# Patient Record
Sex: MALE | Race: WHITE | NOT HISPANIC OR LATINO | Employment: FULL TIME | URBAN - METROPOLITAN AREA
[De-identification: names, ages, dates, MRNs, and addresses within clinical notes are randomized per-mention and may not be internally consistent; named-entity substitution may affect disease eponyms.]

---

## 2017-01-06 ENCOUNTER — COMMUNICATION - HEALTHEAST (OUTPATIENT)
Dept: FAMILY MEDICINE | Facility: CLINIC | Age: 42
End: 2017-01-06

## 2017-01-06 DIAGNOSIS — E03.9 HYPOTHYROIDISM, UNSPECIFIED TYPE: ICD-10-CM

## 2017-01-21 ENCOUNTER — COMMUNICATION - HEALTHEAST (OUTPATIENT)
Dept: FAMILY MEDICINE | Facility: CLINIC | Age: 42
End: 2017-01-21

## 2017-01-21 DIAGNOSIS — E03.9 HYPOTHYROIDISM, UNSPECIFIED TYPE: ICD-10-CM

## 2017-04-04 ENCOUNTER — RECORDS - HEALTHEAST (OUTPATIENT)
Dept: ADMINISTRATIVE | Facility: OTHER | Age: 42
End: 2017-04-04

## 2017-05-01 ENCOUNTER — OFFICE VISIT - HEALTHEAST (OUTPATIENT)
Dept: FAMILY MEDICINE | Facility: CLINIC | Age: 42
End: 2017-05-01

## 2017-05-01 DIAGNOSIS — E03.9 HYPOTHYROIDISM, UNSPECIFIED TYPE: ICD-10-CM

## 2017-05-01 DIAGNOSIS — M24.111 LABRAL TEAR OF SHOULDER, DEGENERATIVE, RIGHT: ICD-10-CM

## 2017-05-01 DIAGNOSIS — Z01.818 PREOP GENERAL PHYSICAL EXAM: ICD-10-CM

## 2017-05-01 LAB
ATRIAL RATE - MUSE: 73 BPM
DIASTOLIC BLOOD PRESSURE - MUSE: NORMAL MMHG
INTERPRETATION ECG - MUSE: NORMAL
P AXIS - MUSE: 0 DEGREES
PR INTERVAL - MUSE: 170 MS
QRS DURATION - MUSE: 92 MS
QT - MUSE: 382 MS
QTC - MUSE: 420 MS
R AXIS - MUSE: -22 DEGREES
SYSTOLIC BLOOD PRESSURE - MUSE: NORMAL MMHG
T AXIS - MUSE: 8 DEGREES
VENTRICULAR RATE- MUSE: 73 BPM

## 2017-05-01 ASSESSMENT — MIFFLIN-ST. JEOR: SCORE: 2537.45

## 2017-05-03 ENCOUNTER — RECORDS - HEALTHEAST (OUTPATIENT)
Dept: ADMINISTRATIVE | Facility: OTHER | Age: 42
End: 2017-05-03

## 2017-05-16 ENCOUNTER — RECORDS - HEALTHEAST (OUTPATIENT)
Dept: ADMINISTRATIVE | Facility: OTHER | Age: 42
End: 2017-05-16

## 2017-06-26 ENCOUNTER — COMMUNICATION - HEALTHEAST (OUTPATIENT)
Dept: FAMILY MEDICINE | Facility: CLINIC | Age: 42
End: 2017-06-26

## 2017-06-26 DIAGNOSIS — E66.9 OBESITY, UNSPECIFIED: ICD-10-CM

## 2017-06-27 ENCOUNTER — RECORDS - HEALTHEAST (OUTPATIENT)
Dept: ADMINISTRATIVE | Facility: OTHER | Age: 42
End: 2017-06-27

## 2017-07-14 ENCOUNTER — COMMUNICATION - HEALTHEAST (OUTPATIENT)
Dept: SURGERY | Facility: CLINIC | Age: 42
End: 2017-07-14

## 2017-09-25 ENCOUNTER — RECORDS - HEALTHEAST (OUTPATIENT)
Dept: ADMINISTRATIVE | Facility: OTHER | Age: 42
End: 2017-09-25

## 2017-10-06 ENCOUNTER — COMMUNICATION - HEALTHEAST (OUTPATIENT)
Dept: FAMILY MEDICINE | Facility: CLINIC | Age: 42
End: 2017-10-06

## 2017-10-06 DIAGNOSIS — N52.9 ERECTILE DYSFUNCTION, UNSPECIFIED ERECTILE DYSFUNCTION TYPE: ICD-10-CM

## 2018-03-16 ENCOUNTER — RECORDS - HEALTHEAST (OUTPATIENT)
Dept: ADMINISTRATIVE | Facility: OTHER | Age: 43
End: 2018-03-16

## 2018-04-17 ENCOUNTER — COMMUNICATION - HEALTHEAST (OUTPATIENT)
Dept: FAMILY MEDICINE | Facility: CLINIC | Age: 43
End: 2018-04-17

## 2018-04-17 DIAGNOSIS — E03.9 HYPOTHYROIDISM, UNSPECIFIED TYPE: ICD-10-CM

## 2018-07-02 ENCOUNTER — OFFICE VISIT - HEALTHEAST (OUTPATIENT)
Dept: FAMILY MEDICINE | Facility: CLINIC | Age: 43
End: 2018-07-02

## 2018-07-02 DIAGNOSIS — Z00.00 ROUTINE GENERAL MEDICAL EXAMINATION AT A HEALTH CARE FACILITY: ICD-10-CM

## 2018-07-02 DIAGNOSIS — E66.9 OBESITY: ICD-10-CM

## 2018-07-02 DIAGNOSIS — E03.9 HYPOTHYROIDISM, UNSPECIFIED TYPE: ICD-10-CM

## 2018-07-02 DIAGNOSIS — N39.9 URINATION DISORDER: ICD-10-CM

## 2018-07-02 DIAGNOSIS — G25.81 RESTLESS LEGS SYNDROME (RLS): ICD-10-CM

## 2018-07-02 ASSESSMENT — MIFFLIN-ST. JEOR: SCORE: 2197.6

## 2018-07-09 ENCOUNTER — AMBULATORY - HEALTHEAST (OUTPATIENT)
Dept: LAB | Facility: CLINIC | Age: 43
End: 2018-07-09

## 2018-07-09 DIAGNOSIS — E03.9 HYPOTHYROIDISM, UNSPECIFIED TYPE: ICD-10-CM

## 2018-07-09 DIAGNOSIS — Z00.00 ROUTINE GENERAL MEDICAL EXAMINATION AT A HEALTH CARE FACILITY: ICD-10-CM

## 2018-07-09 DIAGNOSIS — N39.9 URINATION DISORDER: ICD-10-CM

## 2018-07-09 LAB
ALBUMIN SERPL-MCNC: 4.3 G/DL (ref 3.5–5)
ALP SERPL-CCNC: 83 U/L (ref 45–120)
ALT SERPL W P-5'-P-CCNC: 27 U/L (ref 0–45)
ANION GAP SERPL CALCULATED.3IONS-SCNC: 11 MMOL/L (ref 5–18)
AST SERPL W P-5'-P-CCNC: 21 U/L (ref 0–40)
BILIRUB SERPL-MCNC: 0.6 MG/DL (ref 0–1)
BUN SERPL-MCNC: 17 MG/DL (ref 8–22)
CALCIUM SERPL-MCNC: 9.9 MG/DL (ref 8.5–10.5)
CHLORIDE BLD-SCNC: 109 MMOL/L (ref 98–107)
CHOLEST SERPL-MCNC: 183 MG/DL
CO2 SERPL-SCNC: 23 MMOL/L (ref 22–31)
CREAT SERPL-MCNC: 1.06 MG/DL (ref 0.7–1.3)
ERYTHROCYTE [DISTWIDTH] IN BLOOD BY AUTOMATED COUNT: 11.7 % (ref 11–14.5)
FASTING STATUS PATIENT QL REPORTED: YES
GFR SERPL CREATININE-BSD FRML MDRD: >60 ML/MIN/1.73M2
GLUCOSE BLD-MCNC: 87 MG/DL (ref 70–125)
HCT VFR BLD AUTO: 48.8 % (ref 40–54)
HDLC SERPL-MCNC: 38 MG/DL
HGB BLD-MCNC: 16.7 G/DL (ref 14–18)
LDLC SERPL CALC-MCNC: 115 MG/DL
MCH RBC QN AUTO: 31.2 PG (ref 27–34)
MCHC RBC AUTO-ENTMCNC: 34.1 G/DL (ref 32–36)
MCV RBC AUTO: 91 FL (ref 80–100)
PLATELET # BLD AUTO: 239 THOU/UL (ref 140–440)
PMV BLD AUTO: 8.6 FL (ref 7–10)
POTASSIUM BLD-SCNC: 4.7 MMOL/L (ref 3.5–5)
PROT SERPL-MCNC: 7 G/DL (ref 6–8)
PSA SERPL-MCNC: 0.6 NG/ML (ref 0–2.5)
RBC # BLD AUTO: 5.34 MILL/UL (ref 4.4–6.2)
SODIUM SERPL-SCNC: 143 MMOL/L (ref 136–145)
T4 FREE SERPL-MCNC: 1.1 NG/DL (ref 0.7–1.8)
TRIGL SERPL-MCNC: 150 MG/DL
TSH SERPL DL<=0.005 MIU/L-ACNC: 5.04 UIU/ML (ref 0.3–5)
WBC: 7.9 THOU/UL (ref 4–11)

## 2018-08-08 ENCOUNTER — COMMUNICATION - HEALTHEAST (OUTPATIENT)
Dept: ADMINISTRATIVE | Facility: CLINIC | Age: 43
End: 2018-08-08

## 2018-10-09 ENCOUNTER — COMMUNICATION - HEALTHEAST (OUTPATIENT)
Dept: FAMILY MEDICINE | Facility: CLINIC | Age: 43
End: 2018-10-09

## 2018-10-09 DIAGNOSIS — N52.9 ERECTILE DYSFUNCTION, UNSPECIFIED ERECTILE DYSFUNCTION TYPE: ICD-10-CM

## 2018-10-20 ENCOUNTER — AMBULATORY - HEALTHEAST (OUTPATIENT)
Dept: NURSING | Facility: CLINIC | Age: 43
End: 2018-10-20

## 2018-10-23 ENCOUNTER — COMMUNICATION - HEALTHEAST (OUTPATIENT)
Dept: FAMILY MEDICINE | Facility: CLINIC | Age: 43
End: 2018-10-23

## 2018-10-23 DIAGNOSIS — E66.9 OBESITY: ICD-10-CM

## 2018-11-04 ENCOUNTER — COMMUNICATION - HEALTHEAST (OUTPATIENT)
Dept: FAMILY MEDICINE | Facility: CLINIC | Age: 43
End: 2018-11-04

## 2018-11-07 ENCOUNTER — COMMUNICATION - HEALTHEAST (OUTPATIENT)
Dept: FAMILY MEDICINE | Facility: CLINIC | Age: 43
End: 2018-11-07

## 2018-11-15 ENCOUNTER — RECORDS - HEALTHEAST (OUTPATIENT)
Dept: ADMINISTRATIVE | Facility: OTHER | Age: 43
End: 2018-11-15

## 2019-01-11 ENCOUNTER — RECORDS - HEALTHEAST (OUTPATIENT)
Dept: ADMINISTRATIVE | Facility: OTHER | Age: 44
End: 2019-01-11

## 2019-04-18 ENCOUNTER — COMMUNICATION - HEALTHEAST (OUTPATIENT)
Dept: FAMILY MEDICINE | Facility: CLINIC | Age: 44
End: 2019-04-18

## 2019-05-03 ENCOUNTER — OFFICE VISIT - HEALTHEAST (OUTPATIENT)
Dept: FAMILY MEDICINE | Facility: CLINIC | Age: 44
End: 2019-05-03

## 2019-05-03 DIAGNOSIS — E66.811 CLASS 1 OBESITY WITHOUT SERIOUS COMORBIDITY WITH BODY MASS INDEX (BMI) OF 33.0 TO 33.9 IN ADULT, UNSPECIFIED OBESITY TYPE: ICD-10-CM

## 2019-05-03 RX ORDER — GLYCOPYRROLATE 1 MG/1
TABLET ORAL
Refills: 5 | Status: SHIPPED | COMMUNITY
Start: 2019-02-04 | End: 2022-07-25

## 2019-05-03 RX ORDER — PHENOL 1.4 %
10 AEROSOL, SPRAY (ML) MUCOUS MEMBRANE
Status: SHIPPED | COMMUNITY
Start: 2017-10-30 | End: 2022-05-06

## 2019-10-02 ENCOUNTER — COMMUNICATION - HEALTHEAST (OUTPATIENT)
Dept: FAMILY MEDICINE | Facility: CLINIC | Age: 44
End: 2019-10-02

## 2019-10-02 DIAGNOSIS — E03.9 HYPOTHYROIDISM, UNSPECIFIED TYPE: ICD-10-CM

## 2020-01-05 ENCOUNTER — COMMUNICATION - HEALTHEAST (OUTPATIENT)
Dept: FAMILY MEDICINE | Facility: CLINIC | Age: 45
End: 2020-01-05

## 2020-01-05 DIAGNOSIS — N52.9 ERECTILE DYSFUNCTION, UNSPECIFIED ERECTILE DYSFUNCTION TYPE: ICD-10-CM

## 2020-01-31 ENCOUNTER — RECORDS - HEALTHEAST (OUTPATIENT)
Dept: ADMINISTRATIVE | Facility: OTHER | Age: 45
End: 2020-01-31

## 2020-02-07 ENCOUNTER — COMMUNICATION - HEALTHEAST (OUTPATIENT)
Dept: FAMILY MEDICINE | Facility: CLINIC | Age: 45
End: 2020-02-07

## 2020-02-07 DIAGNOSIS — E03.9 HYPOTHYROIDISM, UNSPECIFIED TYPE: ICD-10-CM

## 2020-02-12 ENCOUNTER — OFFICE VISIT - HEALTHEAST (OUTPATIENT)
Dept: FAMILY MEDICINE | Facility: CLINIC | Age: 45
End: 2020-02-12

## 2020-02-12 DIAGNOSIS — Z00.00 ROUTINE GENERAL MEDICAL EXAMINATION AT A HEALTH CARE FACILITY: ICD-10-CM

## 2020-02-12 DIAGNOSIS — E66.811 CLASS 1 OBESITY WITHOUT SERIOUS COMORBIDITY WITH BODY MASS INDEX (BMI) OF 33.0 TO 33.9 IN ADULT, UNSPECIFIED OBESITY TYPE: ICD-10-CM

## 2020-02-12 DIAGNOSIS — Z23 TETANUS-DIPHTHERIA (TD) VACCINATION: ICD-10-CM

## 2020-02-12 DIAGNOSIS — E03.9 HYPOTHYROIDISM, UNSPECIFIED TYPE: ICD-10-CM

## 2020-02-12 DIAGNOSIS — N52.9 ERECTILE DYSFUNCTION, UNSPECIFIED ERECTILE DYSFUNCTION TYPE: ICD-10-CM

## 2020-02-12 ASSESSMENT — MIFFLIN-ST. JEOR: SCORE: 2163.13

## 2020-02-13 ENCOUNTER — AMBULATORY - HEALTHEAST (OUTPATIENT)
Dept: LAB | Facility: CLINIC | Age: 45
End: 2020-02-13

## 2020-02-13 DIAGNOSIS — Z00.00 ROUTINE GENERAL MEDICAL EXAMINATION AT A HEALTH CARE FACILITY: ICD-10-CM

## 2020-02-13 DIAGNOSIS — E03.9 HYPOTHYROIDISM, UNSPECIFIED TYPE: ICD-10-CM

## 2020-02-13 LAB
ALBUMIN SERPL-MCNC: 4.2 G/DL (ref 3.5–5)
ALP SERPL-CCNC: 84 U/L (ref 45–120)
ALT SERPL W P-5'-P-CCNC: 27 U/L (ref 0–45)
ANION GAP SERPL CALCULATED.3IONS-SCNC: 9 MMOL/L (ref 5–18)
AST SERPL W P-5'-P-CCNC: 25 U/L (ref 0–40)
BILIRUB SERPL-MCNC: 1.1 MG/DL (ref 0–1)
BUN SERPL-MCNC: 16 MG/DL (ref 8–22)
CALCIUM SERPL-MCNC: 9.6 MG/DL (ref 8.5–10.5)
CHLORIDE BLD-SCNC: 106 MMOL/L (ref 98–107)
CHOLEST SERPL-MCNC: 196 MG/DL
CO2 SERPL-SCNC: 26 MMOL/L (ref 22–31)
CREAT SERPL-MCNC: 1.13 MG/DL (ref 0.7–1.3)
FASTING STATUS PATIENT QL REPORTED: YES
GFR SERPL CREATININE-BSD FRML MDRD: >60 ML/MIN/1.73M2
GLUCOSE BLD-MCNC: 88 MG/DL (ref 70–125)
HDLC SERPL-MCNC: 45 MG/DL
LDLC SERPL CALC-MCNC: 129 MG/DL
POTASSIUM BLD-SCNC: 4.4 MMOL/L (ref 3.5–5)
PROT SERPL-MCNC: 7 G/DL (ref 6–8)
PSA SERPL-MCNC: 0.6 NG/ML (ref 0–2.5)
SODIUM SERPL-SCNC: 141 MMOL/L (ref 136–145)
TRIGL SERPL-MCNC: 108 MG/DL
TSH SERPL DL<=0.005 MIU/L-ACNC: 3.06 UIU/ML (ref 0.3–5)

## 2020-03-19 ENCOUNTER — COMMUNICATION - HEALTHEAST (OUTPATIENT)
Dept: FAMILY MEDICINE | Facility: CLINIC | Age: 45
End: 2020-03-19

## 2020-03-19 DIAGNOSIS — E03.9 HYPOTHYROIDISM, UNSPECIFIED TYPE: ICD-10-CM

## 2020-05-18 ENCOUNTER — COMMUNICATION - HEALTHEAST (OUTPATIENT)
Dept: SCHEDULING | Facility: CLINIC | Age: 45
End: 2020-05-18

## 2020-05-18 ENCOUNTER — OFFICE VISIT - HEALTHEAST (OUTPATIENT)
Dept: FAMILY MEDICINE | Facility: CLINIC | Age: 45
End: 2020-05-18

## 2020-05-18 DIAGNOSIS — R10.84 ABDOMINAL PAIN, GENERALIZED: ICD-10-CM

## 2020-05-19 ENCOUNTER — RECORDS - HEALTHEAST (OUTPATIENT)
Dept: GENERAL RADIOLOGY | Facility: CLINIC | Age: 45
End: 2020-05-19

## 2020-05-19 DIAGNOSIS — R10.84 GENERALIZED ABDOMINAL PAIN: ICD-10-CM

## 2020-05-28 ENCOUNTER — COMMUNICATION - HEALTHEAST (OUTPATIENT)
Dept: FAMILY MEDICINE | Facility: CLINIC | Age: 45
End: 2020-05-28

## 2020-05-28 DIAGNOSIS — N52.9 ERECTILE DYSFUNCTION, UNSPECIFIED ERECTILE DYSFUNCTION TYPE: ICD-10-CM

## 2020-05-28 DIAGNOSIS — K59.00 CONSTIPATION, UNSPECIFIED CONSTIPATION TYPE: ICD-10-CM

## 2020-06-13 ENCOUNTER — COMMUNICATION - HEALTHEAST (OUTPATIENT)
Dept: FAMILY MEDICINE | Facility: CLINIC | Age: 45
End: 2020-06-13

## 2020-06-13 DIAGNOSIS — E66.811 CLASS 1 OBESITY WITHOUT SERIOUS COMORBIDITY WITH BODY MASS INDEX (BMI) OF 33.0 TO 33.9 IN ADULT, UNSPECIFIED OBESITY TYPE: ICD-10-CM

## 2020-07-14 ENCOUNTER — COMMUNICATION - HEALTHEAST (OUTPATIENT)
Dept: FAMILY MEDICINE | Facility: CLINIC | Age: 45
End: 2020-07-14

## 2020-07-14 ENCOUNTER — RECORDS - HEALTHEAST (OUTPATIENT)
Dept: ADMINISTRATIVE | Facility: OTHER | Age: 45
End: 2020-07-14

## 2020-07-14 DIAGNOSIS — N52.9 ERECTILE DYSFUNCTION, UNSPECIFIED ERECTILE DYSFUNCTION TYPE: ICD-10-CM

## 2020-07-14 RX ORDER — SILDENAFIL CITRATE 20 MG/1
20 TABLET ORAL PRN
Qty: 60 TABLET | Refills: 6 | Status: SHIPPED | OUTPATIENT
Start: 2020-07-14 | End: 2021-08-06

## 2020-07-16 ENCOUNTER — COMMUNICATION - HEALTHEAST (OUTPATIENT)
Dept: FAMILY MEDICINE | Facility: CLINIC | Age: 45
End: 2020-07-16

## 2020-07-30 ENCOUNTER — RECORDS - HEALTHEAST (OUTPATIENT)
Dept: ADMINISTRATIVE | Facility: OTHER | Age: 45
End: 2020-07-30

## 2020-08-13 ENCOUNTER — RECORDS - HEALTHEAST (OUTPATIENT)
Dept: ADMINISTRATIVE | Facility: OTHER | Age: 45
End: 2020-08-13

## 2020-08-20 ENCOUNTER — COMMUNICATION - HEALTHEAST (OUTPATIENT)
Dept: FAMILY MEDICINE | Facility: CLINIC | Age: 45
End: 2020-08-20

## 2020-08-20 DIAGNOSIS — E03.9 HYPOTHYROIDISM, UNSPECIFIED TYPE: ICD-10-CM

## 2020-09-01 ENCOUNTER — COMMUNICATION - HEALTHEAST (OUTPATIENT)
Dept: FAMILY MEDICINE | Facility: CLINIC | Age: 45
End: 2020-09-01

## 2020-10-19 ENCOUNTER — COMMUNICATION - HEALTHEAST (OUTPATIENT)
Dept: FAMILY MEDICINE | Facility: CLINIC | Age: 45
End: 2020-10-19

## 2020-10-19 DIAGNOSIS — E66.811 CLASS 1 OBESITY WITHOUT SERIOUS COMORBIDITY WITH BODY MASS INDEX (BMI) OF 33.0 TO 33.9 IN ADULT, UNSPECIFIED OBESITY TYPE: ICD-10-CM

## 2020-10-21 ENCOUNTER — OFFICE VISIT - HEALTHEAST (OUTPATIENT)
Dept: FAMILY MEDICINE | Facility: CLINIC | Age: 45
End: 2020-10-21

## 2020-10-21 DIAGNOSIS — E66.811 CLASS 1 OBESITY WITHOUT SERIOUS COMORBIDITY WITH BODY MASS INDEX (BMI) OF 33.0 TO 33.9 IN ADULT, UNSPECIFIED OBESITY TYPE: ICD-10-CM

## 2020-11-03 ENCOUNTER — RECORDS - HEALTHEAST (OUTPATIENT)
Dept: ADMINISTRATIVE | Facility: OTHER | Age: 45
End: 2020-11-03

## 2020-11-19 ENCOUNTER — COMMUNICATION - HEALTHEAST (OUTPATIENT)
Dept: FAMILY MEDICINE | Facility: CLINIC | Age: 45
End: 2020-11-19

## 2021-01-14 ENCOUNTER — COMMUNICATION - HEALTHEAST (OUTPATIENT)
Dept: FAMILY MEDICINE | Facility: CLINIC | Age: 46
End: 2021-01-14

## 2021-01-14 DIAGNOSIS — M19.90 ARTHRITIS: ICD-10-CM

## 2021-01-14 DIAGNOSIS — R41.840 POOR CONCENTRATION: ICD-10-CM

## 2021-03-31 ENCOUNTER — OFFICE VISIT - HEALTHEAST (OUTPATIENT)
Dept: FAMILY MEDICINE | Facility: CLINIC | Age: 46
End: 2021-03-31

## 2021-03-31 DIAGNOSIS — E03.9 HYPOTHYROIDISM, UNSPECIFIED TYPE: ICD-10-CM

## 2021-03-31 DIAGNOSIS — E66.811 CLASS 1 OBESITY WITHOUT SERIOUS COMORBIDITY WITH BODY MASS INDEX (BMI) OF 33.0 TO 33.9 IN ADULT, UNSPECIFIED OBESITY TYPE: ICD-10-CM

## 2021-03-31 LAB
T4 FREE SERPL-MCNC: 1.2 NG/DL (ref 0.7–1.8)
TSH SERPL DL<=0.005 MIU/L-ACNC: 1.66 UIU/ML (ref 0.3–5)

## 2021-03-31 RX ORDER — LEVOTHYROXINE SODIUM 150 UG/1
TABLET ORAL
Qty: 90 TABLET | Refills: 2 | Status: SHIPPED | OUTPATIENT
Start: 2021-03-31 | End: 2022-04-04

## 2021-03-31 ASSESSMENT — MIFFLIN-ST. JEOR: SCORE: 2194.88

## 2021-05-24 ENCOUNTER — OFFICE VISIT - HEALTHEAST (OUTPATIENT)
Dept: FAMILY MEDICINE | Facility: CLINIC | Age: 46
End: 2021-05-24

## 2021-05-24 DIAGNOSIS — F90.9 ATTENTION DEFICIT HYPERACTIVITY DISORDER (ADHD), UNSPECIFIED ADHD TYPE: ICD-10-CM

## 2021-05-24 RX ORDER — MINOCYCLINE HYDROCHLORIDE 100 MG/1
CAPSULE ORAL
Status: SHIPPED | COMMUNITY
Start: 2020-11-03 | End: 2022-05-06

## 2021-05-24 RX ORDER — DEXTROAMPHETAMINE SACCHARATE, AMPHETAMINE ASPARTATE MONOHYDRATE, DEXTROAMPHETAMINE SULFATE AND AMPHETAMINE SULFATE 2.5; 2.5; 2.5; 2.5 MG/1; MG/1; MG/1; MG/1
10 CAPSULE, EXTENDED RELEASE ORAL DAILY
Qty: 30 CAPSULE | Refills: 0 | Status: SHIPPED | OUTPATIENT
Start: 2021-05-24 | End: 2022-07-25

## 2021-05-28 NOTE — PROGRESS NOTES
ASSESSMENT:  1. Class 1 obesity without serious comorbidity with body mass index (BMI) of 33.0 to 33.9 in adult, unspecified obesity type  - phentermine (ADIPEX-P) 37.5 mg tablet; Take 1/2 a tab twice a day.  Dispense: 30 tablet; Refill: 1  Discussed with him the problems with phentermine including side effects as well as complications.  Explained to him the need that he be monitored consistently for blood pressure as well as cardiovascular disease because of the possibility of phentermine causing dose.  He will have his industrial nurse at  keep an eye on his blood pressure and keep a record as well as keeping a record of his weight.  A review of his previous notes showed that he did have an improvement in his weight is at that time.  I encouraged him to also watch his diet and exercise.    PLAN:  Follow-up will be as we planned.    No orders of the defined types were placed in this encounter.    Medications Discontinued During This Encounter   Medication Reason     phentermine (ADIPEX-P) 37.5 mg tablet Reorder       No follow-ups on file.      CHIEF COMPLAINT:  Chief Complaint   Patient presents with     Obesity     would like to restart phentermine        HISTORY OF PRESENT ILLNESS:  Cecil is a 44 y.o. male presenting to the clinic today wanting to restart taking phentermine.  He was prescribed with phentermine about a year ago for weight loss.  He was referred to a weight loss clinic and did have phentermine started.  As at that time he was also exercising and watching his diet and noted to have lost over 80 pounds unfortunately he did stop taking the medicine and is beginning to gain weight back.  He wants to restart weight loss again and is hoping to get a re-view and refilling of his phentermine.  He does not have any heart disease, denied having any chest pain or shortness of breath.  There is no swelling to his lower extremity.  There is a family history of heart disease which he is aware of.  This is 1  of the reasons that he wanted to make sure to lose weight.  He also does have history of hypothyroidism and is on replacement medication for that.    REVIEW OF SYSTEMS:   Full review of system was done and is negative except as stated in HPI.   PFSH:  Reviewed, as below.    Social History     Tobacco Use   Smoking Status Never Smoker   Smokeless Tobacco Never Used       Family History   Problem Relation Age of Onset     Coronary artery disease Mother      Hypertension Mother      Diabetes type II Mother      Benign prostatic hyperplasia Mother      Heart disease Father         Afib with Valvular replacement.     Thyroid disease Father      Thyroid disease Paternal Aunt      Alzheimer's disease Paternal Grandmother        Social History     Socioeconomic History     Marital status:      Spouse name: Not on file     Number of children: Not on file     Years of education: Not on file     Highest education level: Not on file   Occupational History     Not on file   Social Needs     Financial resource strain: Not on file     Food insecurity:     Worry: Not on file     Inability: Not on file     Transportation needs:     Medical: Not on file     Non-medical: Not on file   Tobacco Use     Smoking status: Never Smoker     Smokeless tobacco: Never Used   Substance and Sexual Activity     Alcohol use: Yes     Alcohol/week: 1.2 oz     Types: 1 Glasses of wine, 1 Cans of beer per week     Drug use: Not on file     Sexual activity: Yes     Partners: Female   Lifestyle     Physical activity:     Days per week: Not on file     Minutes per session: Not on file     Stress: Not on file   Relationships     Social connections:     Talks on phone: Not on file     Gets together: Not on file     Attends Yazidi service: Not on file     Active member of club or organization: Not on file     Attends meetings of clubs or organizations: Not on file     Relationship status: Not on file     Intimate partner violence:     Fear of  current or ex partner: Not on file     Emotionally abused: Not on file     Physically abused: Not on file     Forced sexual activity: Not on file   Other Topics Concern     Not on file   Social History Narrative     Not on file       Past Surgical History:   Procedure Laterality Date     RI APPENDECTOMY      Description: Appendectomy;  Recorded: 09/09/2008;  Comments: 1990.       No Known Allergies    Active Ambulatory Problems     Diagnosis Date Noted     Obesity      Hypothyroidism      Routine general medical examination at a health care facility 03/25/2015     Resolved Ambulatory Problems     Diagnosis Date Noted     No Resolved Ambulatory Problems     No Additional Past Medical History       Current Outpatient Medications   Medication Sig Dispense Refill     ibuprofen (ADVIL,MOTRIN) 600 MG tablet TAKE 1 TABLET (600 MG TOTAL) BY MOUTH 3 (THREE) TIMES A DAY. TAKE WITH FOOD 42 tablet 0     levothyroxine (SYNTHROID, LEVOTHROID) 150 MCG tablet Take 1 tablet (150 mcg total) by mouth Daily at 6:00 am. 90 tablet 3     melatonin 10 mg Tab Take 10 mg by mouth.       phentermine (ADIPEX-P) 37.5 mg tablet Take 1/2 a tab twice a day. 30 tablet 1     sildenafil (VIAGRA) 100 MG tablet Take 1 tablet (100 mg total) by mouth daily as needed for erectile dysfunction. 10 tablet 0     glycopyrrolate (ROBINUL) 1 mg tablet TAKE 1-2 TABLETS BY MOUTH TWICE A DAY  5     No current facility-administered medications for this visit.        VITALS:  Vitals:    05/03/19 0752   BP: 112/86   Pulse: 84   Resp: 16   Weight: (!) 268 lb (121.6 kg)     Wt Readings from Last 3 Encounters:   05/03/19 (!) 268 lb (121.6 kg)   07/02/18 (!) 271 lb 9.6 oz (123.2 kg)   05/01/17 (!) 343 lb 14.4 oz (156 kg)     Body mass index is 33.5 kg/m .    PHYSICAL EXAM:  General Appearance: Alert, cooperative, no distress, appears stated age  HEENT: Pupils are equal and reactive, extraocular motions is normal. Neck is supple no notable thyromegaly.  External ears are  normal.  Lungs: Clear to auscultation bilaterally, respirations unlabored  Heart: Regular rhythm and normal rate, S1 and S2 normal, no murmur, rub, or gallop  Abdomen: Soft  Musculoskeletal: Normal range of motion. No joint swelling or deformity.   Neurologic:  Alert and oriented times 3. Cranial nerves II-XII intact.   Psychiatric: Normal mood and affect.    MEDICATIONS:  Current Outpatient Medications   Medication Sig Dispense Refill     ibuprofen (ADVIL,MOTRIN) 600 MG tablet TAKE 1 TABLET (600 MG TOTAL) BY MOUTH 3 (THREE) TIMES A DAY. TAKE WITH FOOD 42 tablet 0     levothyroxine (SYNTHROID, LEVOTHROID) 150 MCG tablet Take 1 tablet (150 mcg total) by mouth Daily at 6:00 am. 90 tablet 3     melatonin 10 mg Tab Take 10 mg by mouth.       phentermine (ADIPEX-P) 37.5 mg tablet Take 1/2 a tab twice a day. 30 tablet 1     sildenafil (VIAGRA) 100 MG tablet Take 1 tablet (100 mg total) by mouth daily as needed for erectile dysfunction. 10 tablet 0     glycopyrrolate (ROBINUL) 1 mg tablet TAKE 1-2 TABLETS BY MOUTH TWICE A DAY  5     No current facility-administered medications for this visit.

## 2021-05-30 VITALS — WEIGHT: 315 LBS | HEIGHT: 76 IN | BODY MASS INDEX: 38.36 KG/M2

## 2021-05-31 ENCOUNTER — RECORDS - HEALTHEAST (OUTPATIENT)
Dept: ADMINISTRATIVE | Facility: CLINIC | Age: 46
End: 2021-05-31

## 2021-06-01 VITALS — HEIGHT: 75 IN | BODY MASS INDEX: 33.77 KG/M2 | WEIGHT: 271.6 LBS

## 2021-06-01 NOTE — TELEPHONE ENCOUNTER
RN cannot approve Refill Request    RN can NOT refill this medication Protocol failed and NO refill given. Last office visit: 5/3/2019 Freddy Jim MD Last Physical: 7/2/2018 Last MTM visit: Visit date not found Last visit same specialty: 5/3/2019 Freddy Jim MD.  Next visit within 3 mo: Visit date not found  Next physical within 3 mo: Visit date not found      Chinyere Najera, [facility] [place] Connection Triage/Med Refill 10/2/2019    Requested Prescriptions   Pending Prescriptions Disp Refills     levothyroxine (SYNTHROID, LEVOTHROID) 150 MCG tablet [Pharmacy Med Name: LEVOTHYROXIN 150MCG TAB] 90 tablet 3     Sig: TAKE 1 TABLET BY MOUTH ONCE DAILY AT  6  AM       Thyroid Hormones Protocol Failed - 10/2/2019  7:01 AM        Failed - TSH on file in past 12 months for patient age 12 & older     TSH   Date Value Ref Range Status   07/09/2018 5.04 (H) 0.30 - 5.00 uIU/mL Final                   Passed - Provider visit in past 12 months or next 3 months     Last office visit with prescriber/PCP: 5/3/2019 Freddy Jim MD OR same dept: 5/3/2019 Freddy Jim MD OR same specialty: 5/3/2019 Freddy Jim MD  Last physical: 7/2/2018 Last MTM visit: Visit date not found   Next visit within 3 mo: Visit date not found  Next physical within 3 mo: Visit date not found  Prescriber OR PCP: Freddy Jim MD  Last diagnosis associated with med order: 1. Hypothyroidism, unspecified type  - levothyroxine (SYNTHROID, LEVOTHROID) 150 MCG tablet [Pharmacy Med Name: LEVOTHYROXIN 150MCG TAB]; TAKE 1 TABLET BY MOUTH ONCE DAILY AT  6  AM  Dispense: 90 tablet; Refill: 3    If protocol passes may refill for 12 months if within 3 months of last provider visit (or a total of 15 months).

## 2021-06-02 VITALS — BODY MASS INDEX: 33.5 KG/M2 | WEIGHT: 268 LBS

## 2021-06-03 ENCOUNTER — COMMUNICATION - HEALTHEAST (OUTPATIENT)
Dept: FAMILY MEDICINE | Facility: CLINIC | Age: 46
End: 2021-06-03

## 2021-06-04 VITALS
BODY MASS INDEX: 33 KG/M2 | DIASTOLIC BLOOD PRESSURE: 84 MMHG | WEIGHT: 264 LBS | OXYGEN SATURATION: 97 % | SYSTOLIC BLOOD PRESSURE: 122 MMHG | HEART RATE: 100 BPM

## 2021-06-04 VITALS
HEIGHT: 75 IN | SYSTOLIC BLOOD PRESSURE: 124 MMHG | BODY MASS INDEX: 32.83 KG/M2 | DIASTOLIC BLOOD PRESSURE: 88 MMHG | WEIGHT: 264 LBS | HEART RATE: 70 BPM | OXYGEN SATURATION: 97 %

## 2021-06-04 NOTE — TELEPHONE ENCOUNTER
Refill Approved    Rx renewed per Medication Renewal Policy. Medication was last renewed on 11/9/18.    Savanna Calero, [facility] Triage/Med Refill 1/5/2020     Requested Prescriptions   Pending Prescriptions Disp Refills     VIAGRA 100 mg tablet [Pharmacy Med Name: Viagra 100 MG Oral Tablet] 6 tablet 0     Sig: TAKE 1 TABLET BY MOUTH ONCE DAILY AS NEEDED FOR ERECTILE DYSFUNCTION       Medications for Impotence Refill Protocol Passed - 1/5/2020  6:30 AM        Passed - PCP or prescribing provider visit in last year     Last office visit with prescriber/PCP: 5/3/2019 Freddy Jim MD OR same dept: 5/3/2019 Freddy Jim MD OR same specialty: 5/3/2019 Freddy Jim MD  Last physical: 7/2/2018 Last MTM visit: Visit date not found   Next visit within 3 mo: Visit date not found  Next physical within 3 mo: Visit date not found  Prescriber OR PCP: Freddy Jim MD  Last diagnosis associated with med order: There are no diagnoses linked to this encounter.  If protocol passes may refill for 12 months if within 3 months of last provider visit (or a total of 15 months).

## 2021-06-05 VITALS
HEIGHT: 75 IN | BODY MASS INDEX: 33.69 KG/M2 | WEIGHT: 271 LBS | OXYGEN SATURATION: 97 % | SYSTOLIC BLOOD PRESSURE: 129 MMHG | HEART RATE: 80 BPM | DIASTOLIC BLOOD PRESSURE: 78 MMHG

## 2021-06-05 NOTE — TELEPHONE ENCOUNTER
RN cannot approve Refill Request    RN can NOT refill this medication Protocol failed and NO refill given.      Margi Valencia, [facility] Triage/Med Refill 2/7/2020    Requested Prescriptions   Pending Prescriptions Disp Refills     levothyroxine (SYNTHROID, LEVOTHROID) 150 MCG tablet [Pharmacy Med Name: Levothyroxine Sodium 150 MCG Oral Tablet] 90 tablet 0     Sig: TAKE 1 TABLET BY MOUTH ONCE DAILY AT  6  AM       Thyroid Hormones Protocol Failed - 2/7/2020  7:22 AM        Failed - TSH on file in past 12 months for patient age 12 & older     TSH   Date Value Ref Range Status   07/09/2018 5.04 (H) 0.30 - 5.00 uIU/mL Final                   Passed - Provider visit in past 12 months or next 3 months     Last office visit with prescriber/PCP: 5/3/2019 Freddy Jim MD OR same dept: 5/3/2019 Freddy Jim MD OR same specialty: 5/3/2019 Freddy Jim MD  Last physical: 7/2/2018 Last MTM visit: Visit date not found   Next visit within 3 mo: Visit date not found  Next physical within 3 mo: Visit date not found  Prescriber OR PCP: Freddy Jim MD  Last diagnosis associated with med order: 1. Hypothyroidism, unspecified type  - levothyroxine (SYNTHROID, LEVOTHROID) 150 MCG tablet [Pharmacy Med Name: Levothyroxine Sodium 150 MCG Oral Tablet]; TAKE 1 TABLET BY MOUTH ONCE DAILY AT  6  AM  Dispense: 90 tablet; Refill: 0    If protocol passes may refill for 12 months if within 3 months of last provider visit (or a total of 15 months).

## 2021-06-07 NOTE — TELEPHONE ENCOUNTER
Refill request for medication: euthyrox  Last visit addressing this medication: 02/20/20  Follow up plan 3  months  Last refill on 02/11/20, quantity #30   CSA completed n/a   checked  03/19/20, last dispensed refill n/a    Appointment: Not due     Roslyn Dawkins, [facility]

## 2021-06-08 NOTE — PROGRESS NOTES
"Cecil Moreira is a 45 y.o. male who is being evaluated via a billable video visit.      The patient has been notified of following:     \"This video visit will be conducted via a call between you and your physician/provider. We have found that certain health care needs can be provided without the need for an in-person physical exam.  This service lets us provide the care you need with a video conversation.  If a prescription is necessary we can send it directly to your pharmacy.  If lab work is needed we can place an order for that and you can then stop by our lab to have the test done at a later time.    Video visits are billed at different rates depending on your insurance coverage. Please reach out to your insurance provider with any questions.    If during the course of the call the physician/provider feels a video visit is not appropriate, you will not be charged for this service.\"    Patient has given verbal consent to a Video visit? Yes    Patient would like to receive their AVS by AVS Preference: Mail a copy.    Patient would like the video invitation sent by: Other e-mail: [email or web address]    Will anyone else be joining your video visit? No        Video Start Time: 9.05    Additional provider notes:  Patient complains of abdominal pain. The pain is described as aching, cramping and sharp, and is 6/10 in intensity. Pain is located in the area of the abdomen below the navel without radiation. Onset was 5 months ago. Symptoms have been gradually worsening since. Aggravating factors: none.  Alleviating factors: none. Associated symptoms: belching, constipation and sweats. The patient denies anorexia and diarrhea.  He is worried about Irritable Bowel.Has been using Metamucil and drinking water and thinks that it is making it worse.  He has some lumps noted on his back which are soft and not painful. He will want to deal with them at another time.  REVIEW OF SYSTEMS:  As in the history. Denies " vomiting. Has no chest pain. No skin rashes etc.  On Examination:  He does not look to be in any distress. Has good eye contact. Respiration is not labored. Speech is full and skin color is normal.  Assessment and plan:  1. Abdominal pain, generalized  - XR Abdomen 2 Views; Future  Sounds like he is having constipation . He has tried stool softeners using metamucil and drinking a lots of water. Will get an xray to see if there any abnormalities.      Video-Visit Details    Type of service:  Video Visit    Video End Time (time video stopped): 9:20 AM  Originating Location (pt. Location): Home    Distant Location (provider location):  [facility]/OB     Platform used for Video Visit: Richard Jim MD

## 2021-06-08 NOTE — TELEPHONE ENCOUNTER
Refill request for medication: phntermine  Last visit addressing this medication: 02/12/20  Follow up plan 3  months  Last refill on 03/19/20 , quantity #30   CSA completed none on file   checked  06/14/20, last dispensed refill 02/12/20    Appointment: Left message for patient     Roslyn Dawkins CMA     normal...

## 2021-06-08 NOTE — TELEPHONE ENCOUNTER
Controlled Substance Refill Request  Medication Name:   Requested Prescriptions     Pending Prescriptions Disp Refills     phentermine (ADIPEX-P) 37.5 mg tablet [Pharmacy Med Name: Phentermine HCl 37.5 MG Oral Tablet] 30 tablet 0     Sig: Take 1/2 (one-half) tablet by mouth twice daily     Date Last Fill: 2/12/20  Requested Pharmacy: [facility]  Submit electronically to pharmacy  Controlled Substance Agreement on file:   Encounter-Level CSA Scan Date:    There are no encounter-level csa scan date.        Last office visit:  5/18/20  Vale Monroy RN, MA  [facility]    Triage Nurse Advisor

## 2021-06-09 NOTE — TELEPHONE ENCOUNTER
Refill Approved    Rx renewed per Medication Renewal Policy. Medication was last renewed on 2/12/20.    Margi Valencia, [facility] Triage/Med Refill 7/14/2020     Requested Prescriptions   Pending Prescriptions Disp Refills     sildenafil (REVATIO) 20 mg tablet 60 tablet 0     Sig: Take 1 tablet (20 mg total) by mouth as needed.       Medications for Impotence Refill Protocol Passed - 7/14/2020  1:58 PM        Passed - PCP or prescribing provider visit in last year     Last office visit with prescriber/PCP: 5/3/2019 Freddy Jim MD OR same dept: Visit date not found OR same specialty: 5/3/2019 Freddy Jim MD  Last physical: 2/12/2020 Last MTM visit: Visit date not found   Next visit within 3 mo: Visit date not found  Next physical within 3 mo: Visit date not found  Prescriber OR PCP: Freddy Jim MD  Last diagnosis associated with med order: 1. Erectile dysfunction, unspecified erectile dysfunction type  - sildenafil (REVATIO) 20 mg tablet; Take 1 tablet (20 mg total) by mouth as needed.  Dispense: 60 tablet; Refill: 0    If protocol passes may refill for 12 months if within 3 months of last provider visit (or a total of 15 months).

## 2021-06-09 NOTE — TELEPHONE ENCOUNTER
Central PA team  [phone]  Pool: HE PA MED ([id number])          PA has been initiated.       PA form completed and faxed insurance via Cover My Meds     Key:  [id number]     Medication:  SILDENAFIL 20MG    Insurance:  [employer] [facility]        Response will be received via fax and may take up to 5-10 business days depending on plan

## 2021-06-09 NOTE — TELEPHONE ENCOUNTER
Please find out from the patient which of the medications he would prefer to get.  Either the Viagra 100mg that will be covered by his insurance, or getting refresher which will not be covered but which he can get a prescription and try to purchase it with a discount card.  Please let me know which ever one that he wants and I will put in an order for those.

## 2021-06-09 NOTE — TELEPHONE ENCOUNTER
Reached out to patient and left a message to return phone call. When patient calls back, please inform him his PA was denied, and ask if he would like to move forward with Viagra 100 mg or Revatio. Please also inform patient he can utilize a discount card to purchase the medication at a lower cost. Thank you, Liseth Martinez

## 2021-06-09 NOTE — TELEPHONE ENCOUNTER
Prior Authorization Request  Who s requesting:  Pharmacy  Pharmacy Name and Location: [id number]  Medication Name: sildenafil (REVATIO) 20 mg tablet   Insurance Plan: [employer] [facility]    Insurance Member ID Number:  [id number]  CoverMyMeds Key: [id number]  Informed patient that prior authorizations can take up to 10 business days for response:   NA  Okay to leave a detailed message: No

## 2021-06-10 NOTE — TELEPHONE ENCOUNTER
Refill Approved    Rx renewed per Medication Renewal Policy. Medication was last renewed on 3/20/20.    Margi Valencia, [facility] Triage/Med Refill 8/24/2020     Requested Prescriptions   Pending Prescriptions Disp Refills     EUTHYROX 150 mcg tablet [Pharmacy Med Name: Euthyrox 150 MCG Oral Tablet] 90 tablet 0     Sig: TAKE 1 TABLET BY MOUTH ONCE DAILY AT 6AM       Thyroid Hormones Protocol Passed - 8/20/2020  6:04 AM        Passed - Provider visit in past 12 months or next 3 months     Last office visit with prescriber/PCP: 5/3/2019 Freddy Jim MD OR same dept: Visit date not found OR same specialty: 5/3/2019 Freddy Jim MD  Last physical: 2/12/2020 Last MTM visit: Visit date not found   Next visit within 3 mo: Visit date not found  Next physical within 3 mo: Visit date not found  Prescriber OR PCP: Freddy Jim MD  Last diagnosis associated with med order: 1. Hypothyroidism, unspecified type  - EUTHYROX 150 mcg tablet [Pharmacy Med Name: Euthyrox 150 MCG Oral Tablet]; TAKE 1 TABLET BY MOUTH ONCE DAILY AT 6AM  Dispense: 90 tablet; Refill: 0    If protocol passes may refill for 12 months if within 3 months of last provider visit (or a total of 15 months).             Passed - TSH on file in past 12 months for patient age 12 & older     TSH   Date Value Ref Range Status   02/13/2020 3.06 0.30 - 5.00 uIU/mL Final

## 2021-06-10 NOTE — PROGRESS NOTES
ASSESSMENT:  1. Preop general physical exam  - HM2(CBC w/o Differential)  - Electrocardiogram Perform and Read    2. Labral tear of shoulder, degenerative, right    3. Hypothyroidism, unspecified type  - levothyroxine (SYNTHROID, LEVOTHROID) 150 MCG tablet; Take 1 tablet (150 mcg total) by mouth Daily at 6:00 am.  Dispense: 90 tablet; Refill: 3  - Thyroid Stimulating Hormone (TSH)     42 y.o. male with planned surgery- Shoulder Arthroscopy due to the above.    Known risk factors for perioperative complications: Obesity.    Difficulty with intubation is not anticipated.    Cardiac Risk Estimation: Has no history of any cardiac issues,he does exercise daily.  He does not have any symptoms of cardiac issues.  He is METs is more than 4.  There is no contraindications to surgery which should be carried out with appropriate anesthesia.    Current medications which may produce withdrawal symptoms if withheld perioperatively: None        PLAN:  There are no Patient Instructions on file for this visit.    Orders Placed This Encounter   Procedures     Thyroid Stimulating Hormone (TSH)     HM2(CBC w/o Differential)     Electrocardiogram Perform and Read     Medications Discontinued During This Encounter   Medication Reason     levothyroxine (SYNTHROID) 150 MCG tablet Therapy completed     levothyroxine (SYNTHROID, LEVOTHROID) 150 MCG tablet Therapy completed     levothyroxine (SYNTHROID, LEVOTHROID) 150 MCG tablet Reorder       No Follow-up on file.       1. Preoperative workup as follows: ECG, HM2(CBC w/o Differential).  His EKG was normal sinus rhythm.  2. Change in medication regimen before surgery: He has stopped taking ibuprofen, and can take his Synthroid with a sip of water on the morning of the surgery..  3. Prophylaxis for cardiac events with perioperative beta-blockers: not indicated.  4. Invasive hemodynamic monitoring perioperatively: at the discretion of anesthesiologist.  5. Deep vein thrombosis prophylaxis  postoperatively:Adequate hydration as well as early ambulation..     Mallampati score: II (hard and soft palate, upper portion of tonsils anduvula visible)    Dentition: No chipped, loose, or missing teeth.       Discussed prophylaxis for medication-induced constipation. Advised patient to stay hydrated.     CHIEF COMPLAINT:  Chief Complaint   Patient presents with     Pre-op Exam     right shoulder arthroscopy, DOS: 5/3/17, Dr Vo, [facility]       HISTORY OF PRESENT ILLNESS:  Cecil is a 42 y.o. male here for a pre-operative consultation. The exam is requested by Dr. Vo in preparation for right shoulder arthroscopy to be performed at [facility] in [place] on May 3, 2017. Today s examination on 5/1/2017 is done to review the underlying surgical condition of osteoarthritis and labral tear of right shoulder, clear for anesthesia, and review medical problems with appropriate changes in medications.    Cecil has tolerated previous surgeries well without bleeding or anesthesia difficulty. He does not have a family history of blood clots, bleeding problems, or anesthesia difficulty.     He popped his right shoulder in high school, and he has had issues since then. He has experienced right shoulder aches since his 30s. He has not used ibuprofen recently.     Hypothyroidism: He takes 150 mcg levothyroxine daily. He denies any symptoms associated with hypothyroidism. His 6/3/2016 TSH lab was 3.46 ulU/mL.     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     REVIEW OF SYSTEMS:   He works out 4 or 5 days per week with walking and elliptical. His diet is not as good as it should be.   He does not snore, but he exhales heavily when he sleeps. He has had a runny nose for a few days that has been improving. He denies cough. He does not have seasonal allergies. He does not have difficulty swallowing. He denies neck pain. He does not have chest pain. He denies constipation and diarrhea. He does not  "have swelling in his lower extremities. All other systems are negative.    PFSH:  Reviewed, as below.     History   Smoking Status     Never Smoker   Smokeless Tobacco     Never Used       Family History   Problem Relation Age of Onset     Coronary artery disease Mother      Hypertension Mother      Diabetes type II Mother      Heart disease Father      Afib with Valvular replacement.     Thyroid disease Father      Thyroid disease Paternal Aunt      Alzheimer's disease Paternal Grandmother        Past Surgical History:   Procedure Laterality Date     CT APPENDECTOMY      Description: Appendectomy;  Recorded: 09/09/2008;  Comments: 1990.       No Known Allergies    Active Ambulatory Problems     Diagnosis Date Noted     Obesity      Hypothyroidism      Routine general medical examination at a health care facility 03/25/2015     Resolved Ambulatory Problems     Diagnosis Date Noted     No Resolved Ambulatory Problems     No Additional Past Medical History       Current Outpatient Prescriptions   Medication Sig Dispense Refill     finasteride (PROPECIA) 1 mg tablet Take 1 tablet (1 mg total) by mouth daily. 90 tablet 1     ibuprofen (ADVIL,MOTRIN) 600 MG tablet TAKE 1 TABLET (600 MG TOTAL) BY MOUTH 3 (THREE) TIMES A DAY. TAKE WITH FOOD 42 tablet 0     levothyroxine (SYNTHROID, LEVOTHROID) 150 MCG tablet Take 1 tablet (150 mcg total) by mouth Daily at 6:00 am. 90 tablet 3     No current facility-administered medications for this visit.        VITALS:  Vitals:    05/01/17 0840   BP: 120/82   Patient Site: Left Arm   Patient Position: Sitting   Cuff Size: Adult Large   Pulse: 80   Temp: 97.6  F (36.4  C)   TempSrc: Oral   SpO2: 96%   Weight: (!) 343 lb 14.4 oz (156 kg)   Height: 6' 3.75\" (1.924 m)     Wt Readings from Last 3 Encounters:   05/01/17 (!) 343 lb 14.4 oz (156 kg)   05/27/16 (!) 350 lb 3.2 oz (158.8 kg)   07/17/15 (!) 346 lb 1.6 oz (157 kg)     Body mass index is 42.14 kg/(m^2).    PHYSICAL EXAM:  General " Appearance: Alert, cooperative, no distress, appears stated age  Head: Normocephalic, without obvious abnormality, atraumatic. No sinus tenderness.   Eyes: Pupils equal, symmetric  Ears: Normal TMs and external ear canals, both ears. Right Ear: Effusion.   Nose: Nares normal, septum midline, mucosa normal.   Throat: Lips, mucosa, and tongue normal; teeth and gums normal  Neck: Supple, symmetrical, trachea midline, no adenopathy;  thyroid: not enlarged, symmetric, no tenderness/mass/nodules  Lungs: Clear to auscultation bilaterally, respirations unlabored  Heart: Regular rate and rhythm, S1 and S2 normal, no murmur, rub, or gallop  Abdomen: Obesity. Soft, non-tender, bowel sounds active all four quadrants, no masses, no organomegaly  Musculoskeletal: Normal range of motion. No joint swelling or deformity.   Extremities normal, atraumatic, no cyanosis or edema  Lymph nodes: Cervical nodes normal  Neurologic:  Alert and oriented times 3. Normal reflexes. Cranial nerves II-XII intact.   Psychiatric: Normal mood and affect.    EKG: Normal sinus rhythm to personal read.       QUALITY MEASURES:  The following high BMI interventions were performed this visit: encouragement to exercise      ADDITIONAL HISTORY SUMMARIZED (2): Reviewed 4/20/2017 note form [facility] regarding right shoulder osteoarthritis.   DECISION TO OBTAIN EXTRA INFORMATION (1): None.   RADIOLOGY TESTS (1): None.  LABS (1): Ordered labs. Reviewed 6/3/2016 thyroid lab.   MEDICINE TESTS (1): Ordered EKG.   INDEPENDENT REVIEW (2 each): Reviewed EKG, as above.    The visit lasted a total of 18 minutes face to face with the patient. Over 50% of the time was spent counseling and educating the patient about osteoarthritis and labral tear of right shoulder and pre-operative measures. An additional 1 minute was spent on exercise encouragement.     Cecil PENA, am scribing for and in the presence of, Dr. Jim.    Dr. Diandra PENA, personally performed  the services described in this documentation, as scribed by Cecil Russell in my presence, and it is both accurate and complete.    MEDICATIONS:  Current Outpatient Prescriptions   Medication Sig Dispense Refill     finasteride (PROPECIA) 1 mg tablet Take 1 tablet (1 mg total) by mouth daily. 90 tablet 1     ibuprofen (ADVIL,MOTRIN) 600 MG tablet TAKE 1 TABLET (600 MG TOTAL) BY MOUTH 3 (THREE) TIMES A DAY. TAKE WITH FOOD 42 tablet 0     levothyroxine (SYNTHROID, LEVOTHROID) 150 MCG tablet Take 1 tablet (150 mcg total) by mouth Daily at 6:00 am. 90 tablet 3     No current facility-administered medications for this visit.        Total data points: 6

## 2021-06-12 NOTE — PROGRESS NOTES
ASSESSMENT:  1. Class 1 obesity without serious comorbidity with body mass index (BMI) of 33.0 to 33.9 in adult, unspecified obesity type  - phentermine (ADIPEX-P) 37.5 mg tablet; Take 1/2 (one-half) tablet by mouth twice daily  Dispense: 30 tablet; Refill: 3      PLAN:  I did review with him the use of the medication as well as the side effects or complications that may be expected.  We discussed the chest pain or shortness of breath as well as palpitations.  I discussed the need for him to come in intermittently for blood pressure checked.    No orders of the defined types were placed in this encounter.    Medications Discontinued During This Encounter   Medication Reason     linaCLOtide (LINZESS) 72 mcg cap capsule Therapy completed     sildenafil (VIAGRA) 100 MG tablet Therapy completed     phentermine (ADIPEX-P) 37.5 mg tablet Reorder       No follow-ups on file.      CHIEF COMPLAINT:  Chief Complaint   Patient presents with     Medication Refill     would like to start Phentermine again        HISTORY OF PRESENT ILLNESS:  Cecil is a 45 y.o. male presenting to the clinic today he wants to restart his medication for weight loss.  He is taking phentermine which he stopped about some 6 months ago.  He noted that for some time now he has been gaining more weight and phentermine had helped him considerably to lose the weight.  In the past when he had taken it he has had no side effects and had felt well with it.  He does not have any history of hypertension at this point, he does exercise as well.  Today's visit has no symptoms, doing well.  He noted having lost a lot of weight on the times that he ate stated and is hoping that this will be about the same.    REVIEW OF SYSTEMS:   As in the history otherwise all other systems are negative.    PFSH:  Reviewed, as below.    Social History     Tobacco Use   Smoking Status Never Smoker   Smokeless Tobacco Never Used       Family History   Problem Relation Age of Onset      Coronary artery disease Mother      Hypertension Mother      Diabetes type II Mother      Benign prostatic hyperplasia Mother      Heart disease Father         Afib with Valvular replacement.     Thyroid disease Father      Thyroid disease Paternal Aunt      Alzheimer's disease Paternal Grandmother        Social History     Socioeconomic History     Marital status:      Spouse name: Not on file     Number of children: Not on file     Years of education: Not on file     Highest education level: Not on file   Occupational History     Not on file   Social Needs     Financial resource strain: Not on file     Food insecurity     Worry: Not on file     Inability: Not on file     Transportation needs     Medical: Not on file     Non-medical: Not on file   Tobacco Use     Smoking status: Never Smoker     Smokeless tobacco: Never Used   Substance and Sexual Activity     Alcohol use: Yes     Alcohol/week: 2.0 standard drinks     Types: 1 Glasses of wine, 1 Cans of beer per week     Drug use: Not on file     Sexual activity: Yes     Partners: Female   Lifestyle     Physical activity     Days per week: Not on file     Minutes per session: Not on file     Stress: Not on file   Relationships     Social connections     Talks on phone: Not on file     Gets together: Not on file     Attends Sabianist service: Not on file     Active member of club or organization: Not on file     Attends meetings of clubs or organizations: Not on file     Relationship status: Not on file     Intimate partner violence     Fear of current or ex partner: Not on file     Emotionally abused: Not on file     Physically abused: Not on file     Forced sexual activity: Not on file   Other Topics Concern     Not on file   Social History Narrative     Not on file       Past Surgical History:   Procedure Laterality Date     [place] APPENDECTOMY      Description: Appendectomy;  Recorded: 09/09/2008;  Comments: 1990.       No Known Allergies    Active  Ambulatory Problems     Diagnosis Date Noted     Obesity      Hypothyroidism      Routine general medical examination at a health care facility 03/25/2015     Resolved Ambulatory Problems     Diagnosis Date Noted     No Resolved Ambulatory Problems     No Additional Past Medical History       Current Outpatient Medications   Medication Sig Dispense Refill     EUTHYROX 150 mcg tablet TAKE 1 TABLET BY MOUTH ONCE DAILY AT 6AM 90 tablet 1     glycopyrrolate (ROBINUL) 1 mg tablet TAKE 1-2 TABLETS BY MOUTH TWICE A DAY  5     phentermine (ADIPEX-P) 37.5 mg tablet Take 1/2 (one-half) tablet by mouth twice daily 30 tablet 3     sildenafil (REVATIO) 20 mg tablet Take 1 tablet (20 mg total) by mouth as needed. 60 tablet 6     ibuprofen (ADVIL,MOTRIN) 600 MG tablet TAKE 1 TABLET (600 MG TOTAL) BY MOUTH 3 (THREE) TIMES A DAY. TAKE WITH FOOD 42 tablet 0     melatonin 10 mg Tab Take 10 mg by mouth.       No current facility-administered medications for this visit.        VITALS:  Vitals:    10/21/20 1129   BP: 122/84   Pulse: 100   SpO2: 97%   Weight: (!) 264 lb (119.7 kg)     Wt Readings from Last 3 Encounters:   10/21/20 (!) 264 lb (119.7 kg)   02/12/20 (!) 264 lb (119.7 kg)   05/03/19 (!) 268 lb (121.6 kg)     Body mass index is 33 kg/m .    PHYSICAL EXAM:  General Appearance: Alert, cooperative, no distress, appears stated age  HEENT: Pupils are equal and reactive, extraocular motions is normal.   Neck is supple no notable thyromegaly.  External ears are normal.  Lungs: Clear to auscultation bilaterally, respirations unlabored  Heart: Regular rhythm and normal rate,S1 and S2 normal  Abdomen: Soft  Musculoskeletal: Normal range of motion. No joint swelling or deformity.   Neurologic:  Alert and oriented times 3.   Psychiatric: Normal mood and affect.    MEDICATIONS:  Current Outpatient Medications   Medication Sig Dispense Refill     EUTHYROX 150 mcg tablet TAKE 1 TABLET BY MOUTH ONCE DAILY AT 6AM 90 tablet 1     glycopyrrolate  (ROBINUL) 1 mg tablet TAKE 1-2 TABLETS BY MOUTH TWICE A DAY  5     phentermine (ADIPEX-P) 37.5 mg tablet Take 1/2 (one-half) tablet by mouth twice daily 30 tablet 3     sildenafil (REVATIO) 20 mg tablet Take 1 tablet (20 mg total) by mouth as needed. 60 tablet 6     ibuprofen (ADVIL,MOTRIN) 600 MG tablet TAKE 1 TABLET (600 MG TOTAL) BY MOUTH 3 (THREE) TIMES A DAY. TAKE WITH FOOD 42 tablet 0     melatonin 10 mg Tab Take 10 mg by mouth.       No current facility-administered medications for this visit.

## 2021-06-12 NOTE — TELEPHONE ENCOUNTER
Controlled Substance Refill Request  Medication Name:   Requested Prescriptions     Pending Prescriptions Disp Refills     phentermine (ADIPEX-P) 37.5 mg tablet 30 tablet 0     Sig: Take 1/2 (one-half) tablet by mouth twice daily     Date Last Fill: 6/15/20  Requested Pharmacy: [facility]  Submit electronically to pharmacy  Controlled Substance Agreement on file:   Encounter-Level CSA Scan Date:    There are no encounter-level csa scan date.        Last office visit:  5/18/20

## 2021-06-12 NOTE — TELEPHONE ENCOUNTER
Please see comment from pharmacy.    Refill request for medication: phentermine (ADIPEX-P) 37.5 mg tablet  Last visit addressing this medication: 2/12/2020  Follow up plan 2-3  months  Last refill on 6/15/2020, quantity #30     Appointment: Please advise of follow up visit type. OV or VV?     Jessica Interiano MA

## 2021-06-16 NOTE — PROGRESS NOTES
ASSESSMENT:  1. Class 1 obesity without serious comorbidity with body mass index (BMI) of 33.0 to 33.9 in adult, unspecified obesity type  - phentermine (ADIPEX-P) 37.5 mg tablet; Take 1 tablet (37.5 mg total) by mouth daily before breakfast.  Dispense: 30 tablet; Refill: 3    2. Hypothyroidism, unspecified type  - levothyroxine (EUTHYROX) 150 MCG tablet; TAKE 1 TABLET BY MOUTH ONCE DAILY AT 6AM  Dispense: 90 tablet; Refill: 2  - Thyroid Cascade  - T4, Free      PLAN / MDM:  Discussed the medication and the side effects. Will refill both and have him follow up in 6 months to recheck.    Orders Placed This Encounter   Procedures     Thyroid [place]     T4, Free     Medications Discontinued During This Encounter   Medication Reason     EUTHYROX 150 mcg tablet Reorder     phentermine (ADIPEX-P) 37.5 mg tablet Reorder       No follow-ups on file.      CHIEF COMPLAINT:  Chief Complaint   Patient presents with     Medication Request     MED REFILL        HISTORY OF PRESENT ILLNESS:  Cecil is a 45 y.o. male presenting to the clinic today for medication management.  He had taken phentermine in the past for weight loss.  He also has had good effect and 7 years ago when he lost almost 100 pounds while on phentermine for about a year.  He has not been taking phentermine at this time noted that he does not have a prescription.  He is going to start  working out for weight loss and will want to start using the Phentermine again. He noted no side effects the previous times he has taken it.  He also takes Levothyroxine and needs to have a refill.  Will get his labs today before the refill.    REVIEW OF SYSTEMS:   He feels well otherwise.All other systems are negative.    PFSH:  Reviewed, as below.    Social History     Tobacco Use   Smoking Status Never Smoker   Smokeless Tobacco Never Used       Family History   Problem Relation Age of Onset     Coronary artery disease Mother      Hypertension Mother      Diabetes type II Mother       Benign prostatic hyperplasia Mother      Heart disease Father         Afib with Valvular replacement.     Thyroid disease Father      Thyroid disease Paternal Aunt      Alzheimer's disease Paternal Grandmother        Social History     Socioeconomic History     Marital status:      Spouse name: Not on file     Number of children: Not on file     Years of education: Not on file     Highest education level: Not on file   Occupational History     Not on file   Social Needs     Financial resource strain: Not on file     Food insecurity     Worry: Not on file     Inability: Not on file     Transportation needs     Medical: Not on file     Non-medical: Not on file   Tobacco Use     Smoking status: Never Smoker     Smokeless tobacco: Never Used   Substance and Sexual Activity     Alcohol use: Yes     Alcohol/week: 2.0 standard drinks     Types: 1 Glasses of wine, 1 Cans of beer per week     Drug use: Not on file     Sexual activity: Yes     Partners: Female   Lifestyle     Physical activity     Days per week: Not on file     Minutes per session: Not on file     Stress: Not on file   Relationships     Social connections     Talks on phone: Not on file     Gets together: Not on file     Attends Taoism service: Not on file     Active member of club or organization: Not on file     Attends meetings of clubs or organizations: Not on file     Relationship status: Not on file     Intimate partner violence     Fear of current or ex partner: Not on file     Emotionally abused: Not on file     Physically abused: Not on file     Forced sexual activity: Not on file   Other Topics Concern     Not on file   Social History Narrative     Not on file       Past Surgical History:   Procedure Laterality Date     [place] APPENDECTOMY      Description: Appendectomy;  Recorded: 09/09/2008;  Comments: 1990.       No Known Allergies    Active Ambulatory Problems     Diagnosis Date Noted     Obesity      Hypothyroidism      Routine  "general medical examination at a health care facility 03/25/2015     Resolved Ambulatory Problems     Diagnosis Date Noted     No Resolved Ambulatory Problems     No Additional Past Medical History       Current Outpatient Medications   Medication Sig Dispense Refill     glycopyrrolate (ROBINUL) 1 mg tablet TAKE 1-2 TABLETS BY MOUTH TWICE A DAY  5     levothyroxine (EUTHYROX) 150 MCG tablet TAKE 1 TABLET BY MOUTH ONCE DAILY AT 6AM 90 tablet 2     phentermine (ADIPEX-P) 37.5 mg tablet Take 1 tablet (37.5 mg total) by mouth daily before breakfast. 30 tablet 3     ibuprofen (ADVIL,MOTRIN) 600 MG tablet TAKE 1 TABLET (600 MG TOTAL) BY MOUTH 3 (THREE) TIMES A DAY. TAKE WITH FOOD 42 tablet 0     melatonin 10 mg Tab Take 10 mg by mouth.       sildenafil (REVATIO) 20 mg tablet Take 1 tablet (20 mg total) by mouth as needed. 60 tablet 6     No current facility-administered medications for this visit.        VITALS:  Vitals:    03/31/21 0854   BP: 129/78   Pulse: 80   SpO2: 97%   Weight: (!) 271 lb (122.9 kg)   Height: 6' 3\" (1.905 m)     Wt Readings from Last 3 Encounters:   03/31/21 (!) 271 lb (122.9 kg)   10/21/20 (!) 264 lb (119.7 kg)   02/12/20 (!) 264 lb (119.7 kg)     Body mass index is 33.87 kg/m .    PHYSICAL EXAM:  General Appearance: Alert, cooperative, no distress, appears stated age, but obese.  HEENT: Pupils are equal and reactive, extraocular motions is normal.  Neck is supple no notable thyromegaly.  External ears are normal.  Lungs: Clear to auscultation bilaterally, respirations unlabored  Heart: Regular rhythm and normal rate,S1 and S2 normal.  Abdomen: Soft  Musculoskeletal: Normal range of motion. No joint swelling or deformity.   Neurologic:  Alert and oriented times 3.   Psychiatric: Normal mood and affect.    MEDICATIONS:  Current Outpatient Medications   Medication Sig Dispense Refill     glycopyrrolate (ROBINUL) 1 mg tablet TAKE 1-2 TABLETS BY MOUTH TWICE A DAY  5     levothyroxine (EUTHYROX) 150 MCG " tablet TAKE 1 TABLET BY MOUTH ONCE DAILY AT 6AM 90 tablet 2     phentermine (ADIPEX-P) 37.5 mg tablet Take 1 tablet (37.5 mg total) by mouth daily before breakfast. 30 tablet 3     ibuprofen (ADVIL,MOTRIN) 600 MG tablet TAKE 1 TABLET (600 MG TOTAL) BY MOUTH 3 (THREE) TIMES A DAY. TAKE WITH FOOD 42 tablet 0     melatonin 10 mg Tab Take 10 mg by mouth.       sildenafil (REVATIO) 20 mg tablet Take 1 tablet (20 mg total) by mouth as needed. 60 tablet 6     No current facility-administered medications for this visit.

## 2021-06-17 NOTE — TELEPHONE ENCOUNTER
Telephone Encounter by Ana Gonzalez at 7/20/2020  2:36 PM     Author: Ana Gonzalez Service: -- Author Type: --    Filed: 7/20/2020  2:37 PM Encounter Date: 7/16/2020 Status: Signed    : Ana Gonzalez       PRIOR AUTHORIZATION DENIED    Denial Rational: This strength is only approved for diagnosis of PAH or secondary Raynaud's Phenomenon          Appeal Information: This medication was denied. If physician would like to appeal because patient has contraindication or allergy to covered medication please write letter of medical necessity and route back to PA team to initiate.  If no further action is needed please close encounter thank you.

## 2021-06-17 NOTE — PROGRESS NOTES
"Cecil Moreira is a 46 y.o. male who is being evaluated via a billable video visit.      How would you like to obtain your AVS? MyChart.  If dropped from the video visit, the video invitation should be resent by: Send to e-mail at: [email or web address]  Will anyone else be joining your video visit? No      Video Start Time: 11:44 AM    Assessment & Plan     Attention deficit hyperactivity disorder (ADHD), unspecified ADHD type  - dextroamphetamine-amphetamine (ADDERALL XR) 10 MG 24 hr capsule  Dispense: 30 capsule; Refill: 0  At this point I think that have the report back yet, but we discussed medication as well as the side effects.  Also discussed the manner in which we prescribed medicines.  He will need to stop taking the phentermine, and I did prescribe Adderall for him.  We will see him back in 1 month for a recheck.  I do hope that by that time we will have gotten the report to review.     :[id number]}     BMI:   Estimated body mass index is 33.87 kg/m  as calculated from the following:    Height as of 3/31/21: 6' 3\" (1.905 m).    Weight as of 3/31/21: 271 lb (122.9 kg).       Return in about 4 weeks (around 6/21/2021) for Recheck.    Freddy Jim MD  [facility]   Cecil Moreira is 46 y.o. and presents today for the following health issues   HPI   We are doing a video visit to discuss ADHD.  He noted having been evaluated by the psychotherapist for concerns but went on difficulty maintaining concentration as well as finally difficult to finish his tasks.  He does have the same symptoms as his children who where diagnosed with ADHD and currently taking medications.  He noted that the therapist made a diagnosis of ADHD as well as anxiety.  He noted that they had recommended use of a stimulant medication.  He has also been taking phentermine for weight loss noting that he is aware that both are stimulants.  He will need to stop taking the " phentermine at this time.  He otherwise does not have any concerns today.      No past medical history on file.  Past Surgical History:   Procedure Laterality Date     WI APPENDECTOMY      Description: Appendectomy;  Recorded: 09/09/2008;  Comments: 1990.     Social History     Socioeconomic History     Marital status:      Spouse name: Not on file     Number of children: Not on file     Years of education: Not on file     Highest education level: Not on file   Occupational History     Not on file   Social Needs     Financial resource strain: Not on file     Food insecurity     Worry: Not on file     Inability: Not on file     Transportation needs     Medical: Not on file     Non-medical: Not on file   Tobacco Use     Smoking status: Never Smoker     Smokeless tobacco: Never Used   Substance and Sexual Activity     Alcohol use: Yes     Alcohol/week: 2.0 standard drinks     Types: 1 Glasses of wine, 1 Cans of beer per week     Drug use: Not on file     Sexual activity: Yes     Partners: Female   Lifestyle     Physical activity     Days per week: Not on file     Minutes per session: Not on file     Stress: Not on file   Relationships     Social connections     Talks on phone: Not on file     Gets together: Not on file     Attends Hoahaoism service: Not on file     Active member of club or organization: Not on file     Attends meetings of clubs or organizations: Not on file     Relationship status: Not on file     Intimate partner violence     Fear of current or ex partner: Not on file     Emotionally abused: Not on file     Physically abused: Not on file     Forced sexual activity: Not on file   Other Topics Concern     Not on file   Social History Narrative     Not on file     Family History   Problem Relation Age of Onset     Coronary artery disease Mother      Hypertension Mother      Diabetes type II Mother      Benign prostatic hyperplasia Mother      Heart disease Father         Afib with Valvular  replacement.     Thyroid disease Father      Thyroid disease Paternal Aunt      Alzheimer's disease Paternal Grandmother      No Known Allergies  Current Outpatient Medications   Medication Sig Dispense Refill     glycopyrrolate (ROBINUL) 1 mg tablet TAKE 1-2 TABLETS BY MOUTH TWICE A DAY  5     ibuprofen (ADVIL,MOTRIN) 600 MG tablet TAKE 1 TABLET (600 MG TOTAL) BY MOUTH 3 (THREE) TIMES A DAY. TAKE WITH FOOD 42 tablet 0     levothyroxine (EUTHYROX) 150 MCG tablet TAKE 1 TABLET BY MOUTH ONCE DAILY AT 6AM 90 tablet 2     melatonin 10 mg Tab Take 10 mg by mouth.       sildenafil (REVATIO) 20 mg tablet Take 1 tablet (20 mg total) by mouth as needed. 60 tablet 6     dextroamphetamine-amphetamine (ADDERALL XR) 10 MG 24 hr capsule Take 1 capsule (10 mg total) by mouth daily. 30 capsule 0     hyoscyamine (LEVSIN/SL) 0.125 mg SL tablet DISSOLVE 1 TABLET IN MOUTH EVERY 6 HOURS AS NEEDED FOR ABDOMINAL CRAMPS       metroNIDAZOLE (METROCREAM) 0.75 % cream APPLY PEA SIZE AMOUNT TO ENTIRE FACE TOPICALLY AT BEDTIME       minocycline (MINOCIN,DYNACIN) 100 MG capsule TAKE 1 CAPSULE BY MOUTH TWICE DAILY FOR 2 4 MONTHS       No current facility-administered medications for this visit.          Review of Systems   Constitutional: Negative.    Respiratory: Negative.    Cardiovascular: Negative for palpitations.   Neurological: Negative for dizziness, numbness and headaches.   Psychiatric/Behavioral: Positive for decreased concentration. Negative for confusion and suicidal ideas. The patient is nervous/anxious.           Objective       Vitals:  No vitals were obtained today due to virtual visit.    Physical Exam   Constitutional: He is oriented to person, place, and time. He appears well-developed and well-nourished. No distress.   Pulmonary/Chest: Effort normal.   Neurological: He is alert and oriented to person, place, and time.   Psychiatric: He has a normal mood and affect.           Video-Visit Details    Type of service:  Video  Visit    Video End Time (time video stopped): 11:58 AM  Originating Location (pt. Location): Home    Distant Location (provider location):  [facility]     Platform used for Video Visit: Carlie

## 2021-06-19 NOTE — PROGRESS NOTES
MALE PREVENTATIVE EXAM    Assessment and Plan:       1. Routine general medical examination at a health care facility  - Lipid Cascade; Future  - Comprehensive Metabolic Panel; Future  - PSA (Prostatic-Specific Antigen), Annual Screen; Future  - HM2(CBC w/o Differential); Future  He is doing very well at this point having lost some weight being physically active.  Labs were ordered for the routine physical exam and will discuss the results when he has had them done.  I did encourage him to continue to be physically active and watch his diet and will follow up with him.  2. Hypothyroidism, unspecified type  - Thyroid Stimulating Hormone (TSH); Future  - T4, Free; Future  - levothyroxine (SYNTHROID, LEVOTHROID) 150 MCG tablet; Take 1 tablet (150 mcg total) by mouth Daily at 6:00 am.  Dispense: 90 tablet; Refill: 3  Levothyroxine was refilled.  I will check the levels at this point.  I will follow-up with him change the dosage if necessary.  3. Urination disorder  - PSA (Prostatic-Specific Antigen), Annual Screen; Future  - Ambulatory referral to Urology  The urinary symptoms that sound like he is having some prostate enlargement issue.  His previous PSA was normal.  I did refer him to see the urologist for evaluation.  4. Obesity  - phentermine (ADIPEX-P) 37.5 mg tablet; Take 1/2 a tab twice a day.  Dispense: 30 tablet; Refill: 1  He does continue to lose weight and continues to follow-up with the obesity clinic.  As noted he has lost about 80 pounds already and continues to be physically active.  We did review the potential complications and side effects of the medication at this point.  A refill was put in for him.  5. Restless legs syndrome (RLS) ?  - Ambulatory referral to Neurology    Based on his history does appear to be restless leg.  I did put in a referral for him to be seen by the neurologist.  In the meantime I talked to him about using lotion that he can put on his feet and end of the day.  He will let me  know how that works.  But he is going to be seen by the neurologist    Next follow up:  No Follow-up on file.    Immunization Review  Adult Imm Review: No immunizations due today  BMI: 33.95  He does not smoke.    I discussed the following with the patient:   Adult Healthy Living: Importance of regular exercise  Healthy nutrition  Getting adequate sleep    I have had an Advance Directives discussion with the patient.       Chief Complaint: Cecil Moreira is an 43 y.o. male here for a preventative health visit.     HPI: Comes in today for a physical exam.  Last physical exam was in 2016.  He noted no major change in his medical history.  He has been working with the weight loss clinics and has been losing weight with the help of phentermine as well as physical activity with exercise and diet.  Noted to have lost a total of about 80 pounds within the last year.  He feels good.  He would like to continue with his phentermine which was being prescribed by the weight loss clinic.  He has an appointment to see them in about a month and a month and a half and will appreciate it if we able to refill his phentermine for him.  He has been noticing some concerns about urination.  He did not having what he describes a 2 stages of urination, first when usually faster and less in quantity and he will have to wait after that for the symptoms to him to call him.  Second stream is also more in quantity.  When he has done that he feels well emptied otherwise he will have to come back in about 10 minutes to finish his urination.  He wakes up about 2 or 3 times at night to urinate.  He denies having any urgency or frequency.  He does not have any dysuria no back pain.  He is worried about prostate and will want to have prostate blood test done.  He notes not very much increase in the urination during the day.  He is also concerned about what he is describing as restless leg syndrome.  He noted that in the evening when he gets home  "and wants to sleep, or laying down he will have this overwhelming need to move his leg.  Notes that sometimes he has to come down from the bed and walk around before that comes down.  He denies any pain and does not have any swelling to lower extremities.  He has had no injuries.   He has a history of hypothyroidism which has been managed with levothyroxine and will get a lab test today for that.    Healthy Habits  Are you taking a daily aspirin? No  Do you typically exercising at least 40 min, 3-4 times per week?  NO  Do you usually eat at least 4 servings of fruit and vegetables a day, include whole grains and fiber and avoid regularly eating high fat foods? Yes  Have you had an eye exam in the past two years? Yes  Do you see a dentist twice per year? NO  Do you have any concerns regarding sleep? No    Safety Screen  If you own firearms, are they secured in a locked gun cabinet or with trigger locks? The patient does not own any firearms  No Data Recorded    Review of Systems:    He denied having any fevers or chills.  Denies any chest pain and no shortness of breath.  He is very physically active and does not have any symptoms other than mentioned above.  Review of systems as noted.  Please see above.  The rest of the review of systems are negative for all systems.     Cancer Screening     Patient has no health maintenance due at this time          Patient Care Team:  Freddy Jim MD as PCP - General        History     Reviewed By Date/Time Sections Reviewed    Gayle Husain CMA 7/2/2018  2:23 PM Tobacco            Objective:   Vital Signs:   Visit Vitals     /80 (Patient Site: Left Arm, Patient Position: Sitting, Cuff Size: Adult Large)     Ht 6' 3\" (1.905 m)     Wt (!) 271 lb 9.6 oz (123.2 kg)     BMI 33.95 kg/m2        Physical Exam:  General Appearance: Alert, cooperative, no distress, appears stated age  Head: Normocephalic, without obvious abnormality, atraumatic  Eyes: PERRL, " conjunctiva/corneas clear, EOM's intact  Ears: Normal TM's and external ear canals, both ears  Nose: Nares normal, septum midline,mucosa normal, no drainage  Throat: Lips, mucosa, and tongue normal; teeth and gums normal  Neck: Supple, symmetrical, trachea midline, no adenopathy;  thyroid: not enlarged, symmetric, no tenderness/mass/nodules; no carotid bruit or JVD  Back: Symmetric, no curvature, ROM normal, no CVA tenderness  Lungs: Clear to auscultation bilaterally, respirations unlabored  Heart: Regular rate and rhythm, S1 and S2 normal, no murmur, rub, or gallop,  Abdomen: Soft, non-tender, bowel sounds active all four quadrants,  no masses, no organomegaly.  Rectal exam was not done patient will be referred to urology and digital rectal exam will be done.  Musculoskeletal: Normal range of motion. No joint swelling or deformity.   Extremities: Extremities normal, atraumatic, no cyanosis or edema.  Dorsalis pedis pulsation was normal, there is no sensation to the lower extremities bilaterally.  Skin: Skin color, texture, turgor normal, no rashes or lesions  Lymph nodes: Cervical, supraclavicular, and axillary nodes normal  Neurologic: He is alert. He has normal reflexes.   Psychiatric: He has a normal mood and affect.            Medication List          These changes are accurate as of 7/2/18  5:56 PM.  If you have any questions, ask your nurse or doctor.               START taking these medications          levothyroxine 150 MCG tablet   Also known as:  SYNTHROID, LEVOTHROID   INSTRUCTIONS:  Take 1 tablet (150 mcg total) by mouth Daily at 6:00 am.   Started by:  Freddy Jim MD             CHANGE how you take these medications          phentermine 37.5 mg tablet   Also known as:  ADIPEX-P   INSTRUCTIONS:  Take 1/2 a tab twice a day.   What changed:  See the new instructions.   Changed by:  Freddy Jim MD             CONTINUE taking these medications          ibuprofen 600 MG tablet    Also known as:  ADVIL,MOTRIN   INSTRUCTIONS:  TAKE 1 TABLET (600 MG TOTAL) BY MOUTH 3 (THREE) TIMES A DAY. TAKE WITH FOOD           sildenafil 100 MG tablet   Also known as:  VIAGRA   INSTRUCTIONS:  Take 1 tablet (100 mg total) by mouth as needed for erectile dysfunction.             STOP taking these medications          finasteride 1 mg tablet   Also known as:  PROPECIA   Stopped by:  Freddy Jim MD            Where to Get Your Medications      These medications were sent to [facility]/pharmacy #[id number] - [place], [place] - [address], [place] [address]     Phone:  [phone]      phentermine 37.5 mg tablet         These medications were sent to [facility] [address] [place] [id number]     Phone:  [phone]      levothyroxine 150 MCG tablet             Additional Screenings Completed Today:

## 2021-06-20 NOTE — LETTER
Letter by Freddy Jim MD at      Author: Freddy Jim MD Service: -- Author Type: --    Filed:  Encounter Date: 9/1/2020 Status: (Other)         Cecil Moreira  [address] [facility] [facility] [place] [address]             September 1, 2020         Dear Mr. Suazomarine,    Below are the results from your recent visit:    No results found from the In Basket message.     Colonoscopy was normal.  Recheck with screening colonoscopy in 5 years.     Please call with questions or contact us using [employer].    Sincerely,        Electronically signed by Freddy Jim MD

## 2021-06-28 NOTE — PROGRESS NOTES
Progress Notes by Freddy Jim MD at 2/12/2020  2:20 PM     Author: Freddy Jim MD Service: -- Author Type: Physician    Filed: 2/12/2020  5:55 PM Encounter Date: 2/12/2020 Status: Signed    : Freddy Jim MD (Physician)       MALE PREVENTATIVE EXAM    Assessment and Plan:       1. Routine general medical examination at a health care facility  - Lipid Cascade; Future  - Comprehensive Metabolic Panel; Future  - PSA (Prostatic-Specific Antigen), Annual Screen; Future  I did put in some labs for him to come back in and complete them.  I have reviewed the previous ones that he has had.  2. Class 1 obesity without serious comorbidity with body mass index (BMI) of 33.0 to 33.9 in adult, unspecified obesity type  - phentermine (ADIPEX-P) 37.5 mg tablet; Take 1/2 a tab twice a day.  Dispense: 30 tablet; Refill: 1  He did have a refill of the phentermine and we did discuss again the side effects.  I informed him that it is not really something that he has to take for a long period of time but he can use that to kick start his weight loss.  I will have him follow-up in about 2 to 3 months to see how he is doing.  He is actually been doing well with his weight loss.  He used to be almost 350 pounds and has lost a lot of weight.  I did encourage him to make sure to be more active since medication alone is not going to be very helpful for the long-term.  3. Hypothyroidism, unspecified type  - Thyroid Cascade; Future  Because of his history of hypothyroidism and the fact that he takes levothyroxine will check the levels and change medications or increase it.  4. Tetanus-diphtheria (Td) vaccination  - Td, Preservative Free (green label)    5. Erectile dysfunction, unspecified erectile dysfunction type  - sildenafil (VIAGRA) 100 MG tablet; Take 1 tablet (100 mg total) by mouth daily as needed for erectile dysfunction.  Dispense: 6 tablet; Refill: 0  - sildenafil (Revatio) 20 mg  tablet; Take 1 tablet (20 mg total) by mouth as needed.  Dispense: 60 tablet; Refill: 0  Has had erectal dysfunction in prescribe medications for him.  He can either get the 100 mg of Viagra there is covered by his insurance or he can get a 20 mg of revatio that is cheaper.      Next follow up:  No follow-ups on file.    Immunization Review  Adult Imm Review: Due today, orders placed  BMI: 33.00 he had actually done a very good job at this time he has lost a lot of weight from being about 343 pounds to current weight.  He does not smoke    I discussed the following with the patient:   Adult Healthy Living: Importance of regular exercise  Healthy nutrition  Weight loss referral options  Stress management    I have had an Advance Directives discussion with the patient.    Subjective:   Chief Complaint: Cecil Moreira is an 44 y.o. male here for a preventative health visit.     HPI:   44-year-old gentleman who comes in for physical exam.  He had his last physical exam about 2 years ago.  He noted no major changes in his health history.  He has been working on his weight loss, and has started using phentermine though he had not used it since April of last year.  He wants to start doing that now so that he can start working on his weight loss again.  He noted no side effects at the prior time that he had used it.  He is currently exercise and the most he can.  He is also watching his diet.  He has had a history of erectile dysfunction and is asking for prescription for Viagra.  He also has a history of hypothyroidism and has been on replacement levothyroxine.  We will check his thyroid levels at this visit.  Noted that he sleeps well, he does not have any problems with sleep apnea.  Denies any chest pain or shortness of breath.  Healthy Habits  Are you taking a daily aspirin? No  Do you typically exercising at least 40 min, 3-4 times per week?  Yes  Do you usually eat at least 4 servings of fruit and vegetables a day,  "include whole grains and fiber and avoid regularly eating high fat foods? Yes  Have you had an eye exam in the past two years? Yes  Do you see a dentist twice per year? NO- once a year   Do you have any concerns regarding sleep? YES - wakes up then trouble going back to sleep   Safety Screen  If you own firearms, are they secured in a locked gun cabinet or with trigger locks? The patient does not own any firearms  Do you feel you are safe where you are living?: Yes (2/12/2020  2:33 PM)  Do you feel you are safe in your relationship(s)?: Yes (2/12/2020  2:33 PM)      Review of Systems:    Constitutional:Denied any fatigue no fevers no chills.  Has good appetite.  HEENT: Does not have any neck pain.  No difficulty swallowing denies having any postnasal drips.    Respiratory: There is no cough.  No chest wall pain.  Cardiovascular: Denied chest pain shortness of breath or palpitations.  There is no notable lower extremity swelling.    Gastrointestinal: Denies nausea vomiting.  No abdominal pain no diarrhea or constipation.  Endocrine:Has no sensitivity to cold or heat.  Denied undue thirst.   Genitourinary:Has no urinary symptoms, no nocturia.  Musculoskeletal: There is no musculoskeletal pain and swelling.    Skin: He does not have any rashes.   Allergic/Immunologic: Negative.   Neurological: No Numbness  Hematological: Negative.   Psychiatric/Behavioral: No anxiety or depression symptoms.  Please see above.  The rest of the review of systems are negative for all systems.     Cancer Screening     Patient has no health maintenance due at this time          Patient Care Team:  Freddy Jim MD as PCP - General  Freddy Jim MD as Assigned PCP        History     Reviewed By Date/Time Sections Reviewed    Terra Ricci CMA 2/12/2020  2:33 PM Tobacco            Objective:   Vital Signs:   Visit Vitals  /88   Pulse 70   Ht 6' 3\" (1.905 m)   Wt (!) 264 lb (119.7 kg)   SpO2 97%   BMI " 33.00 kg/m       Physical Exam:  General Appearance: Alert, cooperative, no distress, appears stated age  Head: Normocephalic, without obvious abnormality, atraumatic  Eyes: PERRL, conjunctiva/corneas clear, EOM's intact  Ears: Normal TM's and external ear canals, both ears  Nose: Nares normal, septum midline,mucosa normal, no drainage  Throat: Lips, mucosa, and tongue normal; teeth and gums normal  Neck: Supple, symmetrical, trachea midline, no adenopathy;  thyroid: not enlarged, symmetric, no tenderness.  Back: Symmetric, no curvature, ROM normal, no CVA tenderness  Lungs: Clear to auscultation bilaterally, respirations unlabored  Heart: Regular rate and rhythm, S1 and S2 normal, no murmur, rub, or gallop,  Abdomen: Soft, non-tender, bowel sounds active all four quadrants,  no masses, no organomegaly  Musculoskeletal: Normal range of motion. No joint swelling or deformity.   Extremities: Extremities normal, atraumatic, no cyanosis or edema  Skin: Skin color, texture, turgor normal, no rashes or lesions  Lymph nodes: Cervical, supraclavicular, and axillary nodes normal  Neurologic: He is alert. He has normal reflexes.   Psychiatric: He has a normal mood and affect.          Medication List          Accurate as of February 12, 2020  3:51 PM. If you have any questions, ask your nurse or doctor.            CHANGE how you take these medications    * sildenafil 100 MG tablet  Also known as:  VIAGRA  INSTRUCTIONS:  Take 1 tablet (100 mg total) by mouth daily as needed for erectile dysfunction.  What changed:  See the new instructions.  Changed by:  Freddy Jim MD        * sildenafil 20 mg tablet  Also known as:  REVATIO  INSTRUCTIONS:  Take 1 tablet (20 mg total) by mouth as needed.  What changed:  You were already taking a medication with the same name, and this prescription was added. Make sure you understand how and when to take each.  Changed by:  Freddy Jim MD            * This list  has 2 medication(s) that are the same as other medications prescribed for you. Read the directions carefully, and ask your doctor or other care provider to review them with you.            CONTINUE taking these medications    glycopyrrolate 1 mg tablet  Also known as:  ROBINUL  INSTRUCTIONS:  TAKE 1-2 TABLETS BY MOUTH TWICE A DAY        ibuprofen 600 MG tablet  Also known as:  ADVIL,MOTRIN  INSTRUCTIONS:  TAKE 1 TABLET (600 MG TOTAL) BY MOUTH 3 (THREE) TIMES A DAY. TAKE WITH FOOD        levothyroxine 150 MCG tablet  Also known as:  SYNTHROID, LEVOTHROID  INSTRUCTIONS:  Take 1 tablet (150 mcg total) by mouth Daily at 6:00 am. Please come in for thyroid lab draw before next refill.        melatonin 10 mg Tab  INSTRUCTIONS:  Take 10 mg by mouth.        phentermine 37.5 mg tablet  Also known as:  ADIPEX-P  INSTRUCTIONS:  Take 1/2 a tab twice a day.              Where to Get Your Medications      These medications were sent to [facility] [address] [place] [id number]    Phone:  [phone]     phentermine 37.5 mg tablet    sildenafil 100 MG tablet     You can get these medications from any pharmacy    Bring a paper prescription for each of these medications    sildenafil 20 mg tablet         Additional Screenings Completed Today:

## 2021-07-03 NOTE — ADDENDUM NOTE
Addendum Note by Shantel Acuna MD at 11/9/2018 10:08 AM     Author: Shantel Acuna MD Service: -- Author Type: Physician    Filed: 11/9/2018 10:08 AM Encounter Date: 11/7/2018 Status: Signed    : Shantel Acuna MD (Physician)    Addended by: SHANTEL ACUNA on: 11/9/2018 10:08 AM        Modules accepted: Orders

## 2021-08-06 DIAGNOSIS — N52.9 ERECTILE DYSFUNCTION, UNSPECIFIED ERECTILE DYSFUNCTION TYPE: ICD-10-CM

## 2021-08-09 ENCOUNTER — TRANSFERRED RECORDS (OUTPATIENT)
Dept: HEALTH INFORMATION MANAGEMENT | Facility: CLINIC | Age: 46
End: 2021-08-09

## 2021-08-09 RX ORDER — SILDENAFIL CITRATE 20 MG/1
20 TABLET ORAL DAILY PRN
Qty: 60 TABLET | Refills: 6 | Status: SHIPPED | OUTPATIENT
Start: 2021-08-09 | End: 2022-05-06

## 2021-08-22 ENCOUNTER — HEALTH MAINTENANCE LETTER (OUTPATIENT)
Age: 46
End: 2021-08-22

## 2021-08-23 ENCOUNTER — TRANSFERRED RECORDS (OUTPATIENT)
Dept: HEALTH INFORMATION MANAGEMENT | Facility: CLINIC | Age: 46
End: 2021-08-23

## 2021-09-29 ENCOUNTER — TRANSFERRED RECORDS (OUTPATIENT)
Dept: HEALTH INFORMATION MANAGEMENT | Facility: CLINIC | Age: 46
End: 2021-09-29

## 2021-10-05 ENCOUNTER — TRANSFERRED RECORDS (OUTPATIENT)
Dept: HEALTH INFORMATION MANAGEMENT | Facility: CLINIC | Age: 46
End: 2021-10-05

## 2021-10-17 ENCOUNTER — HEALTH MAINTENANCE LETTER (OUTPATIENT)
Age: 46
End: 2021-10-17

## 2021-12-22 ENCOUNTER — VIRTUAL VISIT (OUTPATIENT)
Dept: FAMILY MEDICINE | Facility: CLINIC | Age: 46
End: 2021-12-22
Payer: COMMERCIAL

## 2021-12-22 DIAGNOSIS — L65.9 HAIR LOSS: Primary | ICD-10-CM

## 2021-12-22 DIAGNOSIS — E03.9 ACQUIRED HYPOTHYROIDISM: ICD-10-CM

## 2021-12-22 DIAGNOSIS — G89.4 CHRONIC PAIN SYNDROME: ICD-10-CM

## 2021-12-22 PROCEDURE — 99214 OFFICE O/P EST MOD 30 MIN: CPT | Mod: GT | Performed by: FAMILY MEDICINE

## 2021-12-22 RX ORDER — FINASTERIDE 1 MG/1
1 TABLET, FILM COATED ORAL DAILY
Qty: 90 TABLET | Refills: 1 | Status: SHIPPED | OUTPATIENT
Start: 2021-12-22 | End: 2022-06-27

## 2021-12-22 NOTE — PROGRESS NOTES
Cecil is a 46 year old who is being evaluated via a billable video visit.      How would you like to obtain your AVS? MyChart    Will anyone else be joining your video visit? No      Video Start Time: 3:05 PM    Assessment & Plan     Hair loss  Start finasteride  Continue with minoxidil  Need to optimize thyroid function  - finasteride (PROPECIA) 1 MG tablet  Dispense: 90 tablet; Refill: 1    Chronic pain syndrome  Will check rheumatology labs  We had a long discussion regarding optimization of thyroid function  Consider switching to NP thyroid  - TSH  - T4 free  - T3 Free  - Comprehensive metabolic panel  - CBC with platelets  - Anti Nuclear Pretty IgG by IFA with Reflex  - Erythrocyte sedimentation rate auto  - Lyme Disease Pretty with reflex to WB Serum  - Rheumatoid factor    Acquired hypothyroidism  Still withlow energy, unable to lose weight, constipation, low mood, brain fog  We had a long discussion regarding optimization of thyroid function  Consider switching to NP thyroid  - TSH  - T4 free  - T3 Free      Sven Cespedes MD  [facility]   Cecil is a 46 year old who presents for the following health issues     HPI     Gradual hair loss over a period of time  Tried OTC minoxidil with minimal success  Looking for RX    Thinks he has rheumatoid arthritis  Over the last 5-10 years, has been having joint pain and body aches  Osteoarthritis of knee and shoulder  No previous h/o RA  Aunt has a diagnosis of RA    Low energy  Obesity. Unable to lose weight  Constipation, takes daily probiotic  Low mood  Brain fog  Has hypothyroidism, on levothyroxine  Normal TSH and T4 checked several months ago        Objective           Vitals:  No vitals were obtained today due to virtual visit.    Physical Exam   Alert, oriented, NAD    Video-Visit Details    Type of service:  Video Visit    Video End Time:3:30pm    Originating Location (pt. Location): Home    Distant Location  (provider location):  [facility]     Platform used for Video Visit: Carlie

## 2021-12-23 ENCOUNTER — TELEPHONE (OUTPATIENT)
Dept: FAMILY MEDICINE | Facility: CLINIC | Age: 46
End: 2021-12-23
Payer: COMMERCIAL

## 2021-12-23 NOTE — TELEPHONE ENCOUNTER
PRIOR AUTHORIZATION DENIED    Medication: finasteride-DENIED    Denial Date: 12/23/2021    Denial Rational: Plan Exclusion      Appeal Information:

## 2021-12-23 NOTE — TELEPHONE ENCOUNTER
Central Prior Authorization Team   Phone: [phone]      PA Initiation    Medication: finasteride  Insurance Company: [employer] - Phone [phone] Fax [phone]  Pharmacy Filling the Rx: [facility] [address] [place] [address]  Filling Pharmacy Phone: [phone]  Filling Pharmacy Fax:    Start Date: 12/23/2021

## 2021-12-24 ENCOUNTER — TRANSFERRED RECORDS (OUTPATIENT)
Dept: HEALTH INFORMATION MANAGEMENT | Facility: CLINIC | Age: 46
End: 2021-12-24

## 2021-12-24 ENCOUNTER — LAB (OUTPATIENT)
Dept: LAB | Facility: CLINIC | Age: 46
End: 2021-12-24
Payer: COMMERCIAL

## 2021-12-24 DIAGNOSIS — G89.4 CHRONIC PAIN SYNDROME: ICD-10-CM

## 2021-12-24 DIAGNOSIS — E03.9 ACQUIRED HYPOTHYROIDISM: ICD-10-CM

## 2021-12-24 LAB
ALBUMIN SERPL-MCNC: 4 G/DL (ref 3.5–5)
ALP SERPL-CCNC: 84 U/L (ref 45–120)
ALT SERPL W P-5'-P-CCNC: 28 U/L (ref 0–45)
ANION GAP SERPL CALCULATED.3IONS-SCNC: 11 MMOL/L (ref 5–18)
AST SERPL W P-5'-P-CCNC: 22 U/L (ref 0–40)
BILIRUB SERPL-MCNC: 0.7 MG/DL (ref 0–1)
BUN SERPL-MCNC: 15 MG/DL (ref 8–22)
CALCIUM SERPL-MCNC: 9.1 MG/DL (ref 8.5–10.5)
CHLORIDE BLD-SCNC: 107 MMOL/L (ref 98–107)
CO2 SERPL-SCNC: 22 MMOL/L (ref 22–31)
CREAT SERPL-MCNC: 1.07 MG/DL (ref 0.7–1.3)
ERYTHROCYTE [DISTWIDTH] IN BLOOD BY AUTOMATED COUNT: 12.7 % (ref 10–15)
ERYTHROCYTE [SEDIMENTATION RATE] IN BLOOD BY WESTERGREN METHOD: 2 MM/HR (ref 0–15)
GFR SERPL CREATININE-BSD FRML MDRD: 87 ML/MIN/1.73M2
GLUCOSE BLD-MCNC: 99 MG/DL (ref 70–125)
HCT VFR BLD AUTO: 43.8 % (ref 40–53)
HGB BLD-MCNC: 15.3 G/DL (ref 13.3–17.7)
MCH RBC QN AUTO: 31.5 PG (ref 26.5–33)
MCHC RBC AUTO-ENTMCNC: 34.9 G/DL (ref 31.5–36.5)
MCV RBC AUTO: 90 FL (ref 78–100)
PLATELET # BLD AUTO: 246 10E3/UL (ref 150–450)
POTASSIUM BLD-SCNC: 4.3 MMOL/L (ref 3.5–5)
PROT SERPL-MCNC: 6.7 G/DL (ref 6–8)
RBC # BLD AUTO: 4.86 10E6/UL (ref 4.4–5.9)
RHEUMATOID FACT SER NEPH-ACNC: <15 IU/ML (ref 0–30)
SODIUM SERPL-SCNC: 140 MMOL/L (ref 136–145)
T3FREE SERPL-MCNC: 2.7 PG/ML (ref 1.6–3.9)
T4 FREE SERPL-MCNC: 0.94 NG/DL (ref 0.7–1.8)
TSH SERPL DL<=0.005 MIU/L-ACNC: 2.95 UIU/ML (ref 0.3–5)
WBC # BLD AUTO: 6.8 10E3/UL (ref 4–11)

## 2021-12-24 PROCEDURE — 80053 COMPREHEN METABOLIC PANEL: CPT

## 2021-12-24 PROCEDURE — 36415 COLL VENOUS BLD VENIPUNCTURE: CPT

## 2021-12-24 PROCEDURE — 86618 LYME DISEASE ANTIBODY: CPT

## 2021-12-24 PROCEDURE — 86038 ANTINUCLEAR ANTIBODIES: CPT

## 2021-12-24 PROCEDURE — 84439 ASSAY OF FREE THYROXINE: CPT

## 2021-12-24 PROCEDURE — 84443 ASSAY THYROID STIM HORMONE: CPT

## 2021-12-24 PROCEDURE — 85027 COMPLETE CBC AUTOMATED: CPT

## 2021-12-24 PROCEDURE — 85652 RBC SED RATE AUTOMATED: CPT

## 2021-12-24 PROCEDURE — 86039 ANTINUCLEAR ANTIBODIES (ANA): CPT

## 2021-12-24 PROCEDURE — 86431 RHEUMATOID FACTOR QUANT: CPT

## 2021-12-24 PROCEDURE — 84481 FREE ASSAY (FT-3): CPT

## 2021-12-27 LAB
ANA PAT SER IF-IMP: ABNORMAL
ANA SER QL IF: POSITIVE
ANA TITR SER IF: ABNORMAL {TITER}

## 2021-12-27 NOTE — TELEPHONE ENCOUNTER
LM for Cecil to call back.  Please let Cecil know that insurance denied the coverage of the medication finasteride (hair loss medicine).  RUTH BEJARANO on 12/27/2021 at 10:24 AM

## 2021-12-27 NOTE — TELEPHONE ENCOUNTER
Patient returned call and was given message.  Patient states that he will pay out of pocket for medication.      Shanon Smith

## 2021-12-28 LAB — B BURGDOR IGG+IGM SER QL: 0.07

## 2022-01-01 ENCOUNTER — MYC MEDICAL ADVICE (OUTPATIENT)
Dept: FAMILY MEDICINE | Facility: CLINIC | Age: 47
End: 2022-01-01
Payer: COMMERCIAL

## 2022-01-01 DIAGNOSIS — R76.8 POSITIVE ANA (ANTINUCLEAR ANTIBODY): Primary | ICD-10-CM

## 2022-01-05 ENCOUNTER — TELEPHONE (OUTPATIENT)
Dept: MULTI SPECIALTY CLINIC | Facility: CLINIC | Age: 47
End: 2022-01-05
Payer: COMMERCIAL

## 2022-03-01 ENCOUNTER — VIRTUAL VISIT (OUTPATIENT)
Dept: RHEUMATOLOGY | Facility: CLINIC | Age: 47
End: 2022-03-01
Payer: COMMERCIAL

## 2022-03-01 ENCOUNTER — TELEPHONE (OUTPATIENT)
Dept: RHEUMATOLOGY | Facility: CLINIC | Age: 47
End: 2022-03-01

## 2022-03-01 DIAGNOSIS — G89.29 CHRONIC BILATERAL LOW BACK PAIN WITH BILATERAL SCIATICA: ICD-10-CM

## 2022-03-01 DIAGNOSIS — M25.50 CHRONIC PAIN OF MULTIPLE JOINTS: Primary | ICD-10-CM

## 2022-03-01 DIAGNOSIS — G89.29 CHRONIC BUTTOCK PAIN: ICD-10-CM

## 2022-03-01 DIAGNOSIS — G89.29 CHRONIC PAIN OF MULTIPLE JOINTS: Primary | ICD-10-CM

## 2022-03-01 DIAGNOSIS — M54.42 CHRONIC BILATERAL LOW BACK PAIN WITH BILATERAL SCIATICA: ICD-10-CM

## 2022-03-01 DIAGNOSIS — M54.41 CHRONIC BILATERAL LOW BACK PAIN WITH BILATERAL SCIATICA: ICD-10-CM

## 2022-03-01 DIAGNOSIS — M79.18 CHRONIC BUTTOCK PAIN: ICD-10-CM

## 2022-03-01 DIAGNOSIS — R76.8 POSITIVE ANA (ANTINUCLEAR ANTIBODY): ICD-10-CM

## 2022-03-01 DIAGNOSIS — M17.11 PRIMARY OSTEOARTHRITIS OF RIGHT KNEE: ICD-10-CM

## 2022-03-01 PROCEDURE — 99204 OFFICE O/P NEW MOD 45 MIN: CPT | Mod: GT | Performed by: INTERNAL MEDICINE

## 2022-03-01 RX ORDER — CYCLOBENZAPRINE HCL 10 MG
TABLET ORAL
Qty: 30 TABLET | Refills: 2 | Status: SHIPPED | OUTPATIENT
Start: 2022-03-01 | End: 2022-12-29

## 2022-03-01 RX ORDER — MELOXICAM 7.5 MG/1
TABLET ORAL
Qty: 60 TABLET | Refills: 2 | Status: SHIPPED | OUTPATIENT
Start: 2022-03-01 | End: 2022-07-25

## 2022-03-01 NOTE — TELEPHONE ENCOUNTER
Lvmtcb. Please give pt a call to schedule. Thank you      Next Appointment:  2-3 Months       Tests:      Please have labs and x-rays that were ordered performed.

## 2022-03-01 NOTE — PATIENT INSTRUCTIONS
Summary of Your Rheumatology Visit    Next Appointment:  2-3 Months    Medications:    Please follow directives on pill bottle on how to take medication(s) provided.    When starting 2 or more new medications, recommend spacing out the new medications by at least 3 days, this way if you have an allergic reaction there is a greater chance of associating the cause for reaction.    Referrals:     Spine clinic    Tests:     Please have labs and x-rays that were ordered performed.        Injections:      Other:

## 2022-03-01 NOTE — PROGRESS NOTES
Cecil Moreira who presents today with a chief complaint of  No chief complaint on file.      Joint Pains: Yes  Location: multiple joints pain  Onset: years  Intensity: 4 /10  AM Stiffness: 10-20 minutes  Alleviating/Aggravating Factors: physical activities decreases pain. Medications helpful?  Tolerating Meds: Yes  Other:       ROS:  Patient denies having: persistent dry eyes, +dry mouth, recurrent oral ulcers, patchy alopecia, active rashes, photosensitivity, history of psoriasis, active chest pain, active shortness of breath, active cough, active dysuria, history of kidney stones, active abdominal pain, active diarrhea, history of hematochezia, active dysphagia, history of peptic ulcer disease, history of HIV, tuberculosis, hepatitis B or C, Lyme disease, seizure history, raynaud's, active documented fevers, recent infections, difficulty sleeping or chronic unrefreshing sleep, involuntary weight loss, loss of appetite, excessive fatigue, depression, anxiety,  recurrent sinus infections, history of inflammatory eye diseases (such as uveitis, scleritis, iritis, etc).       Information gathered by medical assistant incorporated into this note, was reviewed and discussed with the patient.    Problem List:  Patient Active Problem List   Diagnosis     Obesity     Hypothyroidism     Routine general medical examination at a health care facility        PMH:   No past medical history on file.    Surgical History:  Past Surgical History:   Procedure Laterality Date     Z APPENDECTOMY      Description: Appendectomy;  Recorded: 09/09/2008;  Comments: 1990.       Family History:  Family History   Problem Relation Age of Onset     Coronary Artery Disease Mother      Hypertension Mother      Diabetes Type 2  Mother      Benign prostatic hyperplasia Mother      Heart Disease Father         Afib with Valvular replacement.     Thyroid Disease Father      Thyroid Disease Paternal Aunt      Alzheimer Disease Paternal Grandmother         Social History:   reports that he has never smoked. He has never used smokeless tobacco. He reports current alcohol use of about 2.0 standard drinks of alcohol per week.    Allergies:  No Known Allergies     Current Medications:  Current Outpatient Medications   Medication Sig Dispense Refill     dextroamphetamine-amphetamine (ADDERALL XR) 10 MG 24 hr capsule [DEXTROAMPHETAMINE-AMPHETAMINE (ADDERALL XR) 10 MG 24 HR CAPSULE] Take 1 capsule (10 mg total) by mouth daily. (Patient not taking: Reported on 12/22/2021) 30 capsule 0     finasteride (PROPECIA) 1 MG tablet Take 1 tablet (1 mg) by mouth daily 90 tablet 1     glycopyrrolate (ROBINUL) 1 mg tablet [GLYCOPYRROLATE (ROBINUL) 1 MG TABLET] TAKE 1-2 TABLETS BY MOUTH TWICE A DAY (Patient not taking: Reported on 12/22/2021)  5     hyoscyamine (LEVSIN/SL) 0.125 mg SL tablet [HYOSCYAMINE (LEVSIN/SL) 0.125 MG SL TABLET] DISSOLVE 1 TABLET IN MOUTH EVERY 6 HOURS AS NEEDED FOR ABDOMINAL CRAMPS (Patient not taking: Reported on 12/22/2021)       ibuprofen (ADVIL,MOTRIN) 600 MG tablet [IBUPROFEN (ADVIL,MOTRIN) 600 MG TABLET] TAKE 1 TABLET (600 MG TOTAL) BY MOUTH 3 (THREE) TIMES A DAY. TAKE WITH FOOD (Patient not taking: Reported on 12/22/2021) 42 tablet 0     levothyroxine (EUTHYROX) 150 MCG tablet [LEVOTHYROXINE (EUTHYROX) 150 MCG TABLET] TAKE 1 TABLET BY MOUTH ONCE DAILY AT 6AM 90 tablet 2     melatonin 10 mg Tab [MELATONIN 10 MG TAB] Take 10 mg by mouth. (Patient not taking: Reported on 12/22/2021)       metroNIDAZOLE (METROCREAM) 0.75 % cream [METRONIDAZOLE (METROCREAM) 0.75 % CREAM] APPLY PEA SIZE AMOUNT TO ENTIRE FACE TOPICALLY AT BEDTIME       minocycline (MINOCIN,DYNACIN) 100 MG capsule [MINOCYCLINE (MINOCIN,DYNACIN) 100 MG CAPSULE] TAKE 1 CAPSULE BY MOUTH TWICE DAILY FOR 2 4 MONTHS (Patient not taking: Reported on 12/22/2021)       sildenafil (REVATIO) 20 MG tablet Take 1 tablet (20 mg) by mouth daily as needed (for ED) (Patient not taking: Reported on  12/22/2021) 60 tablet 6           Physical Exam:  Following up today via video visit, per Covid-19 pandemic requirements.    Verbal consent has been obtained for this service by care team member.    Video call start time: 9:23 AM failed, connected via [employer] at 9:30 AM    Video call end time: 10:02 AM    [employer] utilized for video call.    Patient location for video visit: Home     Provider location for video visit:  Home (working remotely)        Summary/Assessment:    Pleasant 46-year-old male presents with chronic multiple joint pains and elevated FABRIZIO.    Patient states for the past 5 years he has been experiencing joint pains which have been progressively worsening.    Sees orthopedics for right greater than left knee pains, states was told to have bone-on-bone. Received cortisone injection involving right knee in the past with partial benefit. Is scheduled to have right knee gel injection.    Has right shoulder pain. Had right shoulder surgery to clean up joint space. History of dislocation as a teenager.    Has low back pains with right greater than left radiculopathy. Also has some buttock pains.    Over the years has tried Tylenol, found to be ineffective.    Has tried ibuprofen and Aleve with only mild benefit.    Currently not taking any prescription or over-the-counter pain medication.    Notices that physical activities can improve pain however has some pain thereafter.    Has morning stiffness lasting 10 to 20 minutes.    Has already tried physical therapy.    Has an aunt and grandmother from mother side with rheumatoid arthritis.    Denies personal or family history for psoriasis.    Denies history of blood clots.    Has a desk job.    Has about 2-3 beers 3 times per week.    Had labs performed showing negative/unremarkable: Rheumatoid factor, Lyme antibody, ESR, CBC, creatinine, LFTs.    Given the above, difficult to tell with certainty at this time as to what the primary source is, contributing to  "symptoms described. Appears to have component of mechanical pains given history of degenerative joint disease involving right knee and right shoulder. Given positive FABRIZIO we will screen for additional signs towards evolving connective tissue disease by obtaining some additional autoimmune markers and thereafter correlating clinically. ANAs are nonspecific and often are falsely positive.    Please see below for management plan.      Pertinent rheumatology/past medical history (please refer to above for more detailed history):      Positive FABRIZIO (1-160 titer, homogeneous pattern)    Osteoarthritis, right knee (\"bone-on-bone\", established with orthopedics)    Right shoulder pain, hx right shoulder surgery (clean up/shave joint, dislocated as a teenager, developed DJD)    Chronic low back pain with right greater than left radiculopathy    Chronic upper buttock pain    Overweight    Family history for RA (aunt and grandmother from mother side)    Regular alcohol consumption (2-3 beers 3 x week on average)    Hypothyroid        Rheumatology medications provided/suggested:    Flexeril  Meloxicam      Pertinent medication from other providers or from otc (please refer to above for more detailed med list):    Melatonin      Pertinent medications already tried:     Tylenol (ineffective)  Ibuprofen and Aleve (only mild benefit)  Cortisone right knee injections (partial benefit)    Pertinent lab history:    Positive/elevated: FABRIZIO    Negative/unremarkable: Rheumatoid factor, Lyme antibody, ESR, CBC, creatinine, LFTs      Pertinent imaging/test history:    States x-rays of knees performed via orthopedics showed bone-on-bone on the right and some signs of DJD on the left.      Other:    Marital status:       How many kids:  3    Type of work:  Security      Drinking alcohol: daily    Tobacco use: no    Recreational drug use: no     States is 6 foot 4 and weighs about 260 pounds.    Plan:      We will add Flexeril " to act as a muscle relaxant hopefully improve her sleep. Can try holding melatonin while on Flexeril.  We will provide meloxicam 7.5 mg twice daily as needed for pain relief.    Will refer to spine clinic for chronic low back pains with episodic bilateral radiculopathy.    Will obtain x-rays of: Lumbar spine and SI joints.    Established with orthopedics regarding right shoulder and knee pains.    Weight loss encouraged.    Will obtain some labs and correlate clinically.    Follow-up in 2 3 months.      Procedure note:     Total time, spent 55  minutes involved with patient care, includes placing orders, reviewing records and formulating management plan.    Major side effect profile of medications provided/suggested were discussed with the patient.    This note was transcribed using Dragon voice recognition software as a result unintentional grammatical errors or word substitutions may have occurred. Please contact our Rheumatology department if you need any clarification or if you have any related inquiries.    Thank you for referring this patient to our clinic.      George Redmond DO ...................  3/1/2022   8:44 AM

## 2022-03-04 ENCOUNTER — TRANSFERRED RECORDS (OUTPATIENT)
Dept: HEALTH INFORMATION MANAGEMENT | Facility: CLINIC | Age: 47
End: 2022-03-04
Payer: COMMERCIAL

## 2022-03-14 ENCOUNTER — HOSPITAL ENCOUNTER (OUTPATIENT)
Dept: RADIOLOGY | Facility: CLINIC | Age: 47
Discharge: HOME | End: 2022-03-14
Attending: INTERNAL MEDICINE
Payer: COMMERCIAL

## 2022-03-14 DIAGNOSIS — G89.29 CHRONIC BILATERAL LOW BACK PAIN WITH BILATERAL SCIATICA: ICD-10-CM

## 2022-03-14 DIAGNOSIS — M54.42 CHRONIC BILATERAL LOW BACK PAIN WITH BILATERAL SCIATICA: ICD-10-CM

## 2022-03-14 DIAGNOSIS — M79.18 CHRONIC BUTTOCK PAIN: ICD-10-CM

## 2022-03-14 DIAGNOSIS — G89.29 CHRONIC BUTTOCK PAIN: ICD-10-CM

## 2022-03-14 DIAGNOSIS — M54.41 CHRONIC BILATERAL LOW BACK PAIN WITH BILATERAL SCIATICA: ICD-10-CM

## 2022-03-14 PROCEDURE — 72100 X-RAY EXAM L-S SPINE 2/3 VWS: CPT

## 2022-03-14 PROCEDURE — 72200 X-RAY EXAM SI JOINTS: CPT

## 2022-04-11 ENCOUNTER — TRANSFERRED RECORDS (OUTPATIENT)
Dept: HEALTH INFORMATION MANAGEMENT | Facility: CLINIC | Age: 47
End: 2022-04-11
Payer: COMMERCIAL

## 2022-05-02 DIAGNOSIS — N52.9 ERECTILE DYSFUNCTION, UNSPECIFIED ERECTILE DYSFUNCTION TYPE: ICD-10-CM

## 2022-05-05 RX ORDER — SILDENAFIL CITRATE 20 MG/1
TABLET ORAL
Qty: 60 TABLET | Refills: 0 | OUTPATIENT
Start: 2022-05-05

## 2022-05-06 ENCOUNTER — MYC MEDICAL ADVICE (OUTPATIENT)
Dept: FAMILY MEDICINE | Facility: CLINIC | Age: 47
End: 2022-05-06
Payer: COMMERCIAL

## 2022-05-06 DIAGNOSIS — N52.9 ERECTILE DYSFUNCTION, UNSPECIFIED ERECTILE DYSFUNCTION TYPE: ICD-10-CM

## 2022-05-06 RX ORDER — SILDENAFIL CITRATE 20 MG/1
TABLET ORAL
Qty: 60 TABLET | Refills: 0 | Status: SHIPPED | OUTPATIENT
Start: 2022-05-06 | End: 2022-06-07

## 2022-05-10 ENCOUNTER — LAB (OUTPATIENT)
Dept: LAB | Facility: CLINIC | Age: 47
End: 2022-05-10
Payer: COMMERCIAL

## 2022-05-10 DIAGNOSIS — R76.8 POSITIVE ANA (ANTINUCLEAR ANTIBODY): ICD-10-CM

## 2022-05-10 LAB
ALBUMIN UR-MCNC: NEGATIVE MG/DL
APPEARANCE UR: CLEAR
BASOPHILS # BLD AUTO: 0 10E3/UL (ref 0–0.2)
BASOPHILS NFR BLD AUTO: 0 %
BILIRUB UR QL STRIP: NEGATIVE
C REACTIVE PROTEIN LHE: 0.2 MG/DL (ref 0–0.8)
C3 SERPL-MCNC: 123 MG/DL (ref 83–177)
C4 SERPL-MCNC: 24 MG/DL (ref 19–59)
CCP AB SER IA-ACNC: <0.5 U/ML
COLOR UR AUTO: YELLOW
CREAT UR-MCNC: 92 MG/DL
EOSINOPHIL # BLD AUTO: 0.1 10E3/UL (ref 0–0.7)
EOSINOPHIL NFR BLD AUTO: 2 %
ERYTHROCYTE [DISTWIDTH] IN BLOOD BY AUTOMATED COUNT: 13.2 % (ref 10–15)
GLUCOSE UR STRIP-MCNC: NEGATIVE MG/DL
HCT VFR BLD AUTO: 47.5 % (ref 40–53)
HGB BLD-MCNC: 16.4 G/DL (ref 13.3–17.7)
HGB UR QL STRIP: NEGATIVE
IMM GRANULOCYTES # BLD: 0 10E3/UL
IMM GRANULOCYTES NFR BLD: 1 %
KETONES UR STRIP-MCNC: NEGATIVE MG/DL
LEUKOCYTE ESTERASE UR QL STRIP: NEGATIVE
LYMPHOCYTES # BLD AUTO: 2.5 10E3/UL (ref 0.8–5.3)
LYMPHOCYTES NFR BLD AUTO: 32 %
MCH RBC QN AUTO: 31.7 PG (ref 26.5–33)
MCHC RBC AUTO-ENTMCNC: 34.5 G/DL (ref 31.5–36.5)
MCV RBC AUTO: 92 FL (ref 78–100)
MICROALBUMIN UR-MCNC: 0.59 MG/DL (ref 0–1.99)
MICROALBUMIN/CREAT UR: 6.4 MG/G CR
MONOCYTES # BLD AUTO: 0.9 10E3/UL (ref 0–1.3)
MONOCYTES NFR BLD AUTO: 11 %
NEUTROPHILS # BLD AUTO: 4.4 10E3/UL (ref 1.6–8.3)
NEUTROPHILS NFR BLD AUTO: 55 %
NITRATE UR QL: NEGATIVE
PH UR STRIP: 5.5 [PH] (ref 5–8)
PLATELET # BLD AUTO: 260 10E3/UL (ref 150–450)
RBC # BLD AUTO: 5.17 10E6/UL (ref 4.4–5.9)
RBC #/AREA URNS AUTO: NORMAL /HPF
SP GR UR STRIP: 1.02 (ref 1–1.03)
URATE SERPL-MCNC: 6.8 MG/DL (ref 3–8)
UROBILINOGEN UR STRIP-ACNC: 0.2 E.U./DL
WBC # BLD AUTO: 8 10E3/UL (ref 4–11)
WBC #/AREA URNS AUTO: NORMAL /HPF

## 2022-05-10 PROCEDURE — 85390 FIBRINOLYSINS SCREEN I&R: CPT | Performed by: PATHOLOGY

## 2022-05-10 PROCEDURE — 82306 VITAMIN D 25 HYDROXY: CPT

## 2022-05-10 PROCEDURE — 86200 CCP ANTIBODY: CPT

## 2022-05-10 PROCEDURE — 84550 ASSAY OF BLOOD/URIC ACID: CPT

## 2022-05-10 PROCEDURE — 86140 C-REACTIVE PROTEIN: CPT

## 2022-05-10 PROCEDURE — 86147 CARDIOLIPIN ANTIBODY EA IG: CPT | Mod: 59

## 2022-05-10 PROCEDURE — 86160 COMPLEMENT ANTIGEN: CPT | Mod: 59

## 2022-05-10 PROCEDURE — 85025 COMPLETE CBC W/AUTO DIFF WBC: CPT

## 2022-05-10 PROCEDURE — 86235 NUCLEAR ANTIGEN ANTIBODY: CPT | Mod: 59

## 2022-05-10 PROCEDURE — 85613 RUSSELL VIPER VENOM DILUTED: CPT

## 2022-05-10 PROCEDURE — 86225 DNA ANTIBODY NATIVE: CPT

## 2022-05-10 PROCEDURE — 82043 UR ALBUMIN QUANTITATIVE: CPT

## 2022-05-10 PROCEDURE — 81001 URINALYSIS AUTO W/SCOPE: CPT

## 2022-05-10 PROCEDURE — 86147 CARDIOLIPIN ANTIBODY EA IG: CPT

## 2022-05-10 PROCEDURE — 86160 COMPLEMENT ANTIGEN: CPT

## 2022-05-10 PROCEDURE — 36415 COLL VENOUS BLD VENIPUNCTURE: CPT

## 2022-05-10 PROCEDURE — 85730 THROMBOPLASTIN TIME PARTIAL: CPT

## 2022-05-11 LAB
CARDIOLIPIN IGG SER IA-ACNC: 2 GPL-U/ML
CARDIOLIPIN IGG SER IA-ACNC: NEGATIVE
CARDIOLIPIN IGM SER IA-ACNC: <2 MPL-U/ML
CARDIOLIPIN IGM SER IA-ACNC: NEGATIVE
DEPRECATED CALCIDIOL+CALCIFEROL SERPL-MC: 28 UG/L (ref 20–75)
DRVVT SCREEN RATIO: 1.02
DSDNA AB SER-ACNC: 0.6 IU/ML
ENA SM IGG SER IA-ACNC: 2.1 U/ML
ENA SM IGG SER IA-ACNC: NEGATIVE
ENA SS-A AB SER IA-ACNC: <0.5 U/ML
ENA SS-A AB SER IA-ACNC: NEGATIVE
ENA SS-B IGG SER IA-ACNC: <0.6 U/ML
ENA SS-B IGG SER IA-ACNC: NEGATIVE
INR PPP: 0.96 (ref 0.85–1.15)
LA PPP-IMP: NEGATIVE
LUPUS INTERPRETATION: NORMAL
PTT RATIO: 0.92
THROMBIN TIME: 16.8 SECONDS (ref 13–19)
U1 SNRNP IGG SER IA-ACNC: 4 U/ML
U1 SNRNP IGG SER IA-ACNC: NEGATIVE

## 2022-05-13 LAB
CARDIOLIPIN IGA SER IA-ACNC: 2.9 APL-U/ML
CARDIOLIPIN IGA SER IA-ACNC: NEGATIVE

## 2022-06-06 DIAGNOSIS — N52.9 ERECTILE DYSFUNCTION, UNSPECIFIED ERECTILE DYSFUNCTION TYPE: ICD-10-CM

## 2022-06-07 RX ORDER — SILDENAFIL CITRATE 20 MG/1
TABLET ORAL
Qty: 60 TABLET | Refills: 0 | Status: SHIPPED | OUTPATIENT
Start: 2022-06-07 | End: 2022-07-11

## 2022-06-07 NOTE — TELEPHONE ENCOUNTER
"Routing refill request to provider for review/approval because:  Early refill request      Last Written Prescription Date:  5/6/2022  Last Fill Quantity: 60,  # refills: 0   Last office visit provider:  12/22/2021     Requested Prescriptions   Pending Prescriptions Disp Refills     sildenafil (REVATIO) 20 MG tablet [Pharmacy Med Name: Sildenafil Citrate 20 MG Oral Tablet] 60 tablet 0     Sig: TAKE 1 TABLET BY MOUTH ONCE DAILY AS NEEDED       Erectile Dysfuction Protocol Passed - 6/6/2022 12:39 PM        Passed - Absence of nitrates on medication list        Passed - Absence of Alpha Blockers on Med list        Passed - Recent (12 mo) or future (30 days) visit within the authorizing provider's specialty     Patient has had an office visit with the authorizing provider or a provider within the authorizing providers department within the previous 12 mos or has a future within next 30 days. See \"Patient Info\" tab in inbasket, or \"Choose Columns\" in Meds & Orders section of the refill encounter.              Passed - Medication is active on med list        Passed - Patient is age 18 or older             Deborah Dawson RN 06/07/22 9:40 AM  "

## 2022-06-27 DIAGNOSIS — L65.9 HAIR LOSS: ICD-10-CM

## 2022-06-27 RX ORDER — FINASTERIDE 1 MG/1
TABLET, FILM COATED ORAL
Qty: 30 TABLET | Refills: 0 | Status: SHIPPED | OUTPATIENT
Start: 2022-06-27 | End: 2022-07-25

## 2022-06-27 NOTE — TELEPHONE ENCOUNTER
Routing refill request to provider for review/approval because:  Drug does not pass the [facility] refill protocol     Miscellaneous Dermatologic Agents Failed     Refill request is not for Imiquimod, 5-Fluorouracil, or Finasteride        SUMIT Aguillon  [facility]

## 2022-06-30 ENCOUNTER — TRANSFERRED RECORDS (OUTPATIENT)
Dept: HEALTH INFORMATION MANAGEMENT | Facility: CLINIC | Age: 47
End: 2022-06-30

## 2022-07-11 DIAGNOSIS — N52.9 ERECTILE DYSFUNCTION, UNSPECIFIED ERECTILE DYSFUNCTION TYPE: ICD-10-CM

## 2022-07-11 RX ORDER — SILDENAFIL CITRATE 20 MG/1
TABLET ORAL
Qty: 60 TABLET | Refills: 0 | Status: SHIPPED | OUTPATIENT
Start: 2022-07-11 | End: 2022-07-25

## 2022-07-12 NOTE — TELEPHONE ENCOUNTER
"Last Written Prescription Date:  6/7/22  Last Fill Quantity: 60,  # refills: 0   Last office visit provider:  12/22/21     Requested Prescriptions   Pending Prescriptions Disp Refills     sildenafil (REVATIO) 20 MG tablet [Pharmacy Med Name: Sildenafil Citrate 20 MG Oral Tablet] 60 tablet 0     Sig: TAKE 1 TABLET BY MOUTH ONCE DAILY AS NEEDED       Erectile Dysfuction Protocol Passed - 7/11/2022 10:40 AM        Passed - Absence of nitrates on medication list        Passed - Absence of Alpha Blockers on Med list        Passed - Recent (12 mo) or future (30 days) visit within the authorizing provider's specialty     Patient has had an office visit with the authorizing provider or a provider within the authorizing providers department within the previous 12 mos or has a future within next 30 days. See \"Patient Info\" tab in inbasket, or \"Choose Columns\" in Meds & Orders section of the refill encounter.              Passed - Medication is active on med list        Passed - Patient is age 18 or older             Vale Monroy 07/11/22 8:16 PM  "

## 2022-07-25 ENCOUNTER — OFFICE VISIT (OUTPATIENT)
Dept: FAMILY MEDICINE | Facility: CLINIC | Age: 47
End: 2022-07-25
Payer: COMMERCIAL

## 2022-07-25 ENCOUNTER — MYC MEDICAL ADVICE (OUTPATIENT)
Dept: FAMILY MEDICINE | Facility: CLINIC | Age: 47
End: 2022-07-25

## 2022-07-25 VITALS
HEART RATE: 76 BPM | BODY MASS INDEX: 34.24 KG/M2 | WEIGHT: 273.9 LBS | DIASTOLIC BLOOD PRESSURE: 82 MMHG | SYSTOLIC BLOOD PRESSURE: 124 MMHG

## 2022-07-25 DIAGNOSIS — M25.50 CHRONIC PAIN OF MULTIPLE JOINTS: ICD-10-CM

## 2022-07-25 DIAGNOSIS — E03.9 ACQUIRED HYPOTHYROIDISM: Primary | ICD-10-CM

## 2022-07-25 DIAGNOSIS — M17.11 PRIMARY OSTEOARTHRITIS OF RIGHT KNEE: ICD-10-CM

## 2022-07-25 DIAGNOSIS — M54.41 CHRONIC BILATERAL LOW BACK PAIN WITH BILATERAL SCIATICA: ICD-10-CM

## 2022-07-25 DIAGNOSIS — G89.29 CHRONIC BILATERAL LOW BACK PAIN WITH BILATERAL SCIATICA: ICD-10-CM

## 2022-07-25 DIAGNOSIS — R61 GENERALIZED HYPERHIDROSIS: ICD-10-CM

## 2022-07-25 DIAGNOSIS — M79.18 CHRONIC BUTTOCK PAIN: ICD-10-CM

## 2022-07-25 DIAGNOSIS — G89.29 CHRONIC BUTTOCK PAIN: ICD-10-CM

## 2022-07-25 DIAGNOSIS — L65.9 HAIR LOSS: ICD-10-CM

## 2022-07-25 DIAGNOSIS — G89.29 CHRONIC PAIN OF MULTIPLE JOINTS: ICD-10-CM

## 2022-07-25 DIAGNOSIS — N52.9 ERECTILE DYSFUNCTION, UNSPECIFIED ERECTILE DYSFUNCTION TYPE: ICD-10-CM

## 2022-07-25 DIAGNOSIS — Z12.5 SCREENING FOR PROSTATE CANCER: ICD-10-CM

## 2022-07-25 DIAGNOSIS — F41.9 ANXIETY: ICD-10-CM

## 2022-07-25 DIAGNOSIS — M54.42 CHRONIC BILATERAL LOW BACK PAIN WITH BILATERAL SCIATICA: ICD-10-CM

## 2022-07-25 LAB
ALBUMIN SERPL BCG-MCNC: 4.5 G/DL (ref 3.5–5.2)
ALP SERPL-CCNC: 92 U/L (ref 40–129)
ALT SERPL W P-5'-P-CCNC: 34 U/L (ref 10–50)
ANION GAP SERPL CALCULATED.3IONS-SCNC: 13 MMOL/L (ref 7–15)
AST SERPL W P-5'-P-CCNC: 31 U/L (ref 10–50)
BILIRUB SERPL-MCNC: 0.6 MG/DL
BUN SERPL-MCNC: 14.5 MG/DL (ref 6–20)
CALCIUM SERPL-MCNC: 9.4 MG/DL (ref 8.6–10)
CHLORIDE SERPL-SCNC: 106 MMOL/L (ref 98–107)
CREAT SERPL-MCNC: 1.07 MG/DL (ref 0.67–1.17)
DEPRECATED HCO3 PLAS-SCNC: 22 MMOL/L (ref 22–29)
ERYTHROCYTE [DISTWIDTH] IN BLOOD BY AUTOMATED COUNT: 12.8 % (ref 10–15)
GFR SERPL CREATININE-BSD FRML MDRD: 86 ML/MIN/1.73M2
GLUCOSE SERPL-MCNC: 87 MG/DL (ref 70–99)
HCT VFR BLD AUTO: 47.3 % (ref 40–53)
HGB BLD-MCNC: 16.2 G/DL (ref 13.3–17.7)
MAGNESIUM SERPL-MCNC: 2.3 MG/DL (ref 1.7–2.3)
MCH RBC QN AUTO: 32 PG (ref 26.5–33)
MCHC RBC AUTO-ENTMCNC: 34.2 G/DL (ref 31.5–36.5)
MCV RBC AUTO: 94 FL (ref 78–100)
PHOSPHATE SERPL-MCNC: 2.9 MG/DL (ref 2.5–4.5)
PLATELET # BLD AUTO: 234 10E3/UL (ref 150–450)
POTASSIUM SERPL-SCNC: 5 MMOL/L (ref 3.4–5.3)
PROT SERPL-MCNC: 7.1 G/DL (ref 6.4–8.3)
PSA SERPL-MCNC: 0.63 NG/ML (ref 0–2.5)
PTH-INTACT SERPL-MCNC: 38 PG/ML (ref 15–65)
RBC # BLD AUTO: 5.06 10E6/UL (ref 4.4–5.9)
SODIUM SERPL-SCNC: 141 MMOL/L (ref 136–145)
T4 FREE SERPL-MCNC: 1.18 NG/DL (ref 0.9–1.7)
TSH SERPL DL<=0.005 MIU/L-ACNC: 4.08 UIU/ML (ref 0.3–4.2)
VIT B12 SERPL-MCNC: 377 PG/ML (ref 232–1245)
WBC # BLD AUTO: 8.1 10E3/UL (ref 4–11)

## 2022-07-25 PROCEDURE — 99214 OFFICE O/P EST MOD 30 MIN: CPT | Performed by: FAMILY MEDICINE

## 2022-07-25 PROCEDURE — 82607 VITAMIN B-12: CPT | Performed by: FAMILY MEDICINE

## 2022-07-25 PROCEDURE — 84481 FREE ASSAY (FT-3): CPT | Performed by: FAMILY MEDICINE

## 2022-07-25 PROCEDURE — 84443 ASSAY THYROID STIM HORMONE: CPT | Performed by: FAMILY MEDICINE

## 2022-07-25 PROCEDURE — 85027 COMPLETE CBC AUTOMATED: CPT | Performed by: FAMILY MEDICINE

## 2022-07-25 PROCEDURE — 84100 ASSAY OF PHOSPHORUS: CPT | Performed by: FAMILY MEDICINE

## 2022-07-25 PROCEDURE — 83970 ASSAY OF PARATHORMONE: CPT | Performed by: FAMILY MEDICINE

## 2022-07-25 PROCEDURE — G0103 PSA SCREENING: HCPCS | Performed by: FAMILY MEDICINE

## 2022-07-25 PROCEDURE — 80053 COMPREHEN METABOLIC PANEL: CPT | Performed by: FAMILY MEDICINE

## 2022-07-25 PROCEDURE — 83735 ASSAY OF MAGNESIUM: CPT | Performed by: FAMILY MEDICINE

## 2022-07-25 PROCEDURE — 84439 ASSAY OF FREE THYROXINE: CPT | Performed by: FAMILY MEDICINE

## 2022-07-25 PROCEDURE — 36415 COLL VENOUS BLD VENIPUNCTURE: CPT | Performed by: FAMILY MEDICINE

## 2022-07-25 RX ORDER — TADALAFIL 10 MG/1
10 TABLET ORAL DAILY PRN
Qty: 30 TABLET | Refills: 11 | Status: SHIPPED | OUTPATIENT
Start: 2022-07-25 | End: 2022-12-29

## 2022-07-25 RX ORDER — GLYCOPYRROLATE 1 MG/1
TABLET ORAL
Qty: 90 TABLET | Refills: 3 | Status: SHIPPED | OUTPATIENT
Start: 2022-07-25 | End: 2022-11-16

## 2022-07-25 RX ORDER — SILDENAFIL CITRATE 20 MG/1
TABLET ORAL
Qty: 30 TABLET | Refills: 11 | Status: SHIPPED | OUTPATIENT
Start: 2022-07-25 | End: 2022-12-29

## 2022-07-25 RX ORDER — BUPROPION HYDROCHLORIDE 300 MG/1
300 TABLET ORAL DAILY
Qty: 90 TABLET | Refills: 3 | Status: SHIPPED | OUTPATIENT
Start: 2022-07-25 | End: 2024-04-10

## 2022-07-25 RX ORDER — BUPROPION HYDROCHLORIDE 300 MG/1
300 TABLET ORAL DAILY
COMMUNITY
Start: 2022-06-06 | End: 2022-07-25

## 2022-07-25 RX ORDER — TADALAFIL 10 MG/1
10 TABLET ORAL DAILY PRN
Qty: 30 TABLET | Refills: 11 | OUTPATIENT
Start: 2022-07-25

## 2022-07-25 RX ORDER — MELOXICAM 7.5 MG/1
TABLET ORAL
Qty: 90 TABLET | Refills: 3 | Status: SHIPPED | OUTPATIENT
Start: 2022-07-25 | End: 2023-09-19

## 2022-07-25 RX ORDER — FINASTERIDE 1 MG/1
1 TABLET, FILM COATED ORAL DAILY
Qty: 90 TABLET | Refills: 3 | Status: SHIPPED | OUTPATIENT
Start: 2022-07-25 | End: 2023-09-19

## 2022-07-25 NOTE — TELEPHONE ENCOUNTER
See Gladis from Patient needing PCP review.    Cialis sent to Rx refill pool, I do not see the NP thyroid med?       SUMIT Aguillon  [facility]

## 2022-07-25 NOTE — PROGRESS NOTES
Assessment & Plan     Acquired hypothyroidism  Had a long discussion regarding TSH, thyroid hormones, and how they relate to symptoms.  It may be that still has hypofunctioning thyroid at the cellular level given his symptoms of fatigue and arthralgia.  We will check levels.  We will switch the levothyroxine over to that of NP thyroid.  Call in 2 weeks with update.  Follow-up in 3 months  - T3 Free  - T4 free  - TSH  - Comprehensive metabolic panel (BMP + Alb, Alk Phos, ALT, AST, Total. Bili, TP)    Anxiety, mood d/o  Stable.  Follow-up yearly  - buPROPion (WELLBUTRIN XL) 300 MG 24 hr tablet  Dispense: 90 tablet; Refill: 3    Erectile dysfunction, unspecified erectile dysfunction type  Refilled.  Follow-up yearly  - tadalafil (CIALIS) 10 MG tablet  Dispense: 30 tablet; Refill: 11  - sildenafil (REVATIO) 20 MG tablet  Dispense: 30 tablet; Refill: 11    Chronic bilateral low back pain with bilateral sciatica, Chronic buttock pain, Chronic pain of multiple joints  Refilled.  Follow-up yearly  - meloxicam (MOBIC) 7.5 MG tablet  Dispense: 90 tablet; Refill: 3  - Magnesium  - Parathyroid Hormone Intact  - CBC with platelets  - Phosphorus  - Magnesium  - Parathyroid Hormone Intact  - CBC with platelets  - Phosphorus  -B12  -vitamin d    Primary osteoarthritis of right knee  Refilled. Follow-up yearly  - meloxicam (MOBIC) 7.5 MG tablet  Dispense: 90 tablet; Refill: 3    Hair loss  Refilled. Follow-up yearly  - finasteride (PROPECIA) 1 MG tablet  Dispense: 90 tablet; Refill: 3    Screening for prostate cancer  - PSA, screen    Generalized hyperhidrosis  Refilled. Follow-up yearly  - glycopyrrolate (ROBINUL) 1 MG tablet  Dispense: 90 tablet; Refill: 3      Sven Cespedes MD  [facility]    Concepción Jacob is a 47 year old presenting for the following health issues:  Thyroid (Discuss thyroid issues and plan.  Pt would like to see if he could establish care, Dr. Butler see's his  family.)      HPI     Hypothyroidism for years  Dad with hypothyroidism  Still with fatigue even with good sleep and joint pain  He has seen a rheumatologist with management related to musculoskeletal symptoms.  Flexeril and meloxicam were given to the patient.  There was questions regarding secondary hyperparathyroidism but I do not see anything in the chart regarding lab results.    He has excessive sweating and takes glycopyrrolate.  He has hair loss and takes finasteride.  He has erectile dysfunction and alternate between tadalafil and sildenafil.    He has been on Wellbutrin for about a year now for anxiety and depression.  He states the medication is working well for him.  He no longer is having panic attack.  He has been  from his wife for about a year.        Objective    /82   Pulse 76   Wt 124.2 kg (273 lb 14.4 oz)   BMI 34.24 kg/m    Body mass index is 34.24 kg/m .  Physical Exam     Constitutional: Patient is oriented to person, place, and time. Patient appears well-developed and well-nourished. No distress.   Head: Normocephalic and atraumatic.   Right Ear: External ear normal.   Left Ear: External ear normal.   Eyes: Conjunctivae and EOM are normal. Right eye exhibits no discharge. Left eye exhibits no discharge. No scleral icterus.   Neurological: Patient is alert and oriented to person, place, and time.  Skin: No rash noted. Patient is not diaphoretic. No erythema. No pallor.    The patient has good eye contact.  No psychomotor retardation or stereotypical behaviors.  Speech was regular rate, regular rhythm, adequate responses.  Mood was stable and affect was congruent mood.  No suicidal or homicidal intent.  No hallucination.

## 2022-07-26 ENCOUNTER — TELEPHONE (OUTPATIENT)
Dept: FAMILY MEDICINE | Facility: CLINIC | Age: 47
End: 2022-07-26

## 2022-07-26 LAB — T3FREE SERPL-MCNC: 2.7 PG/ML (ref 2–4.4)

## 2022-07-26 RX ORDER — THYROID 60 MG/1
60 TABLET ORAL DAILY
Qty: 90 TABLET | Refills: 1 | Status: SHIPPED | OUTPATIENT
Start: 2022-07-26 | End: 2022-12-29

## 2022-07-26 NOTE — TELEPHONE ENCOUNTER
PRIOR AUTHORIZATION DENIED    Medication: tadalafil (CIALIS) 10 MG tablet - EPA DENIED    Denial Date: 7/25/2022     Denial Rational: MAX 6 TABS ARE COVERED BY PLAN - PHARMACY AND PT NOTIFIED      Appeal Information:

## 2022-07-26 NOTE — TELEPHONE ENCOUNTER
Recommend patient go to good [facility] and print off coupon to use for out of pocket at [facility], which should be about $10-12

## 2022-08-27 ENCOUNTER — TRANSFERRED RECORDS (OUTPATIENT)
Dept: HEALTH INFORMATION MANAGEMENT | Facility: CLINIC | Age: 47
End: 2022-08-27

## 2022-08-31 ENCOUNTER — TRANSFERRED RECORDS (OUTPATIENT)
Dept: HEALTH INFORMATION MANAGEMENT | Facility: CLINIC | Age: 47
End: 2022-08-31

## 2022-08-31 ENCOUNTER — MEDICAL CORRESPONDENCE (OUTPATIENT)
Dept: HEALTH INFORMATION MANAGEMENT | Facility: CLINIC | Age: 47
End: 2022-08-31

## 2022-10-02 ENCOUNTER — HEALTH MAINTENANCE LETTER (OUTPATIENT)
Age: 47
End: 2022-10-02

## 2022-11-15 DIAGNOSIS — R61 GENERALIZED HYPERHIDROSIS: ICD-10-CM

## 2022-11-16 RX ORDER — GLYCOPYRROLATE 1 MG/1
TABLET ORAL
Qty: 90 TABLET | Refills: 0 | Status: SHIPPED | OUTPATIENT
Start: 2022-11-16 | End: 2022-12-12

## 2022-12-09 DIAGNOSIS — R61 GENERALIZED HYPERHIDROSIS: ICD-10-CM

## 2022-12-12 RX ORDER — GLYCOPYRROLATE 1 MG/1
TABLET ORAL
Qty: 90 TABLET | Refills: 0 | Status: SHIPPED | OUTPATIENT
Start: 2022-12-12 | End: 2022-12-29

## 2022-12-29 ENCOUNTER — OFFICE VISIT (OUTPATIENT)
Dept: FAMILY MEDICINE | Facility: CLINIC | Age: 47
End: 2022-12-29
Payer: COMMERCIAL

## 2022-12-29 ENCOUNTER — TELEPHONE (OUTPATIENT)
Dept: FAMILY MEDICINE | Facility: CLINIC | Age: 47
End: 2022-12-29

## 2022-12-29 VITALS
RESPIRATION RATE: 20 BRPM | TEMPERATURE: 97 F | SYSTOLIC BLOOD PRESSURE: 142 MMHG | BODY MASS INDEX: 34.96 KG/M2 | WEIGHT: 279.7 LBS | DIASTOLIC BLOOD PRESSURE: 102 MMHG | HEART RATE: 92 BPM | OXYGEN SATURATION: 97 %

## 2022-12-29 DIAGNOSIS — Z11.59 NEED FOR HEPATITIS C SCREENING TEST: ICD-10-CM

## 2022-12-29 DIAGNOSIS — L64.9 MALE PATTERN BALDNESS: ICD-10-CM

## 2022-12-29 DIAGNOSIS — Z11.4 SCREENING FOR HIV (HUMAN IMMUNODEFICIENCY VIRUS): ICD-10-CM

## 2022-12-29 DIAGNOSIS — F90.9 ATTENTION DEFICIT HYPERACTIVITY DISORDER (ADHD), UNSPECIFIED ADHD TYPE: Primary | ICD-10-CM

## 2022-12-29 DIAGNOSIS — E03.9 ACQUIRED HYPOTHYROIDISM: ICD-10-CM

## 2022-12-29 DIAGNOSIS — N52.9 ERECTILE DYSFUNCTION, UNSPECIFIED ERECTILE DYSFUNCTION TYPE: ICD-10-CM

## 2022-12-29 DIAGNOSIS — R61 GENERALIZED HYPERHIDROSIS: ICD-10-CM

## 2022-12-29 DIAGNOSIS — F41.1 GAD (GENERALIZED ANXIETY DISORDER): ICD-10-CM

## 2022-12-29 DIAGNOSIS — N25.81 SECONDARY HYPERPARATHYROIDISM (H): ICD-10-CM

## 2022-12-29 PROBLEM — N32.81 OVERACTIVE BLADDER: Status: ACTIVE | Noted: 2018-11-15

## 2022-12-29 PROBLEM — N32.81 OVERACTIVE BLADDER: Status: RESOLVED | Noted: 2018-11-15 | Resolved: 2022-12-29

## 2022-12-29 LAB
AMPHETAMINES UR QL SCN: ABNORMAL
BARBITURATES UR QL SCN: ABNORMAL
BENZODIAZ UR QL SCN: ABNORMAL
BZE UR QL SCN: ABNORMAL
CANNABINOIDS UR QL SCN: ABNORMAL
CREAT UR-MCNC: 61.8 MG/DL
OPIATES UR QL SCN: ABNORMAL
OXYCODONE UR QL: ABNORMAL
PCP QUAL URINE (ROCHE): ABNORMAL

## 2022-12-29 PROCEDURE — 90686 IIV4 VACC NO PRSV 0.5 ML IM: CPT | Performed by: STUDENT IN AN ORGANIZED HEALTH CARE EDUCATION/TRAINING PROGRAM

## 2022-12-29 PROCEDURE — 99214 OFFICE O/P EST MOD 30 MIN: CPT | Mod: 25 | Performed by: STUDENT IN AN ORGANIZED HEALTH CARE EDUCATION/TRAINING PROGRAM

## 2022-12-29 PROCEDURE — 80307 DRUG TEST PRSMV CHEM ANLYZR: CPT | Performed by: STUDENT IN AN ORGANIZED HEALTH CARE EDUCATION/TRAINING PROGRAM

## 2022-12-29 PROCEDURE — 90471 IMMUNIZATION ADMIN: CPT | Performed by: STUDENT IN AN ORGANIZED HEALTH CARE EDUCATION/TRAINING PROGRAM

## 2022-12-29 RX ORDER — GLYCOPYRROLATE 1 MG/1
3 TABLET ORAL DAILY
Qty: 270 TABLET | Refills: 3 | Status: SHIPPED | OUTPATIENT
Start: 2022-12-29 | End: 2024-01-04

## 2022-12-29 RX ORDER — DEXTROAMPHETAMINE SACCHARATE, AMPHETAMINE ASPARTATE MONOHYDRATE, DEXTROAMPHETAMINE SULFATE AND AMPHETAMINE SULFATE 2.5; 2.5; 2.5; 2.5 MG/1; MG/1; MG/1; MG/1
10 CAPSULE, EXTENDED RELEASE ORAL DAILY
Qty: 30 CAPSULE | Refills: 0 | Status: SHIPPED | OUTPATIENT
Start: 2022-12-29 | End: 2023-01-24

## 2022-12-29 RX ORDER — SILDENAFIL 50 MG/1
50 TABLET, FILM COATED ORAL DAILY PRN
Qty: 60 TABLET | Refills: 3 | Status: SHIPPED | OUTPATIENT
Start: 2022-12-29 | End: 2023-05-04

## 2022-12-29 RX ORDER — LEVOTHYROXINE SODIUM 150 UG/1
150 TABLET ORAL DAILY
Qty: 90 TABLET | Refills: 3 | Status: SHIPPED | OUTPATIENT
Start: 2022-12-29 | End: 2024-01-05

## 2022-12-29 RX ORDER — TADALAFIL 20 MG/1
20 TABLET ORAL DAILY PRN
Qty: 60 TABLET | Refills: 3 | Status: SHIPPED | OUTPATIENT
Start: 2022-12-29 | End: 2023-05-04

## 2022-12-29 ASSESSMENT — PAIN SCALES - GENERAL: PAINLEVEL: NO PAIN (0)

## 2022-12-29 NOTE — TELEPHONE ENCOUNTER
PRIOR AUTHORIZATION DENIED    Medication: sildenafil (VIAGRA) 50 MG tablet - EPA DENIED    Denial Date: 12/29/2022    Denial Rational:       Appeal Information:

## 2022-12-29 NOTE — TELEPHONE ENCOUNTER
PRIOR AUTHORIZATION DENIED    Medication: tadalafil (CIALIS) 20 MG tablet - EPA DENIED    Denial Date: 12/29/2022    Denial Rational:      Appeal Information:

## 2022-12-29 NOTE — PROGRESS NOTES
Assessment and Plan     47-year-old male with past medical history of obesity, hypothyroidism, secondary hyperparathyroidism, ADHD, generalized anxiety disorder, generalized hyperhidrosis, erectile dysfunction, male pattern baldness who presents for establishment of care and refill of several of his medicines.    1. Screening for HIV (human immunodeficiency virus)  2. Need for hepatitis C screening test  Discuss at next physical    3. Generalized hyperhidrosis  - glycopyrrolate (ROBINUL) 1 MG tablet; Take 3 tablets (3 mg) by mouth daily  Dispense: 270 tablet; Refill: 3    4. Erectile dysfunction, unspecified erectile dysfunction type  Uses both sildenafil or cialis depending on the day. Never together  - sildenafil (VIAGRA) 50 MG tablet; Take 1 tablet (50 mg) by mouth daily as needed (erectile dysfunction)  Dispense: 60 tablet; Refill: 3  - tadalafil (CIALIS) 20 MG tablet; Take 1 tablet (20 mg) by mouth daily as needed (erectile dysfunction)  Dispense: 60 tablet; Refill: 3    5. REBEKAH (generalized anxiety disorder)  Depression/REBEKAH HX:  Hx of anxiety attacks which improved with wellbutrin.  This was related to a divorce he went through approximately fall 2021.  He feels his anxiety has significantly improved and thinks Wellbutrin may be contributing to some of his erectile dysfunction.  He is going to trial stopping this.    Current Treatment:  Wellbutrin 300 mg XR daily    No flowsheet data found.    No flowsheet data found.    6. Secondary hyperparathyroidism (H)  Diagnosed in 2017 thought to be secondary to vitamin D deficiency and inadequate calcium intake.  Recommend to supplement.  This was checked in July 2022 and normal    7. Acquired hypothyroidism  TSH normal in July 2022  - levothyroxine (SYNTHROID/LEVOTHROID) 150 MCG tablet; Take 1 tablet (150 mcg) by mouth daily  Dispense: 90 tablet; Refill: 3    8. Attention deficit hyperactivity disorder (ADHD), unspecified ADHD type  Diagnosed through [facility].  Have  records of this.  Never started medication.  We discussed risks and benefits and he wishes to trial this.  We will start him on 10 mg Adderall XR daily.  Increase in a month when I see him virtually`  - amphetamine-dextroamphetamine (ADDERALL XR) 10 MG 24 hr capsule; Take 1 capsule (10 mg) by mouth daily  Dispense: 30 capsule; Refill: 0  - Urine Drugs of Abuse Screen Panel 1 - Drug Screen (Full); Future    Follow up: 1 month for virtual ADHD visit  Options for treatment and follow-up care were reviewed with the patient and/or guardian. Cecil Moreira and/or guardian engaged in the decision making process and verbalized understanding of the options discussed and agreed with the final plan.    Dr. Jame Colunga         HPI:   Cecil Moreira is a 47 year old  male who presents for:    Chief Complaint   Patient presents with     History of Present Illness     ADD, right ear infection?, hypothyroid, med refill . Gylopyrrolate med should be 3 pills per day and not 1. Need med of levothyroxine.      Patient is here to establish care and refill several of his medicines.  Previous 2 physicians have left [place].      BP Readings from Last 6 Encounters:   12/29/22 (!) 142/102   07/25/22 124/82   03/31/21 129/78   10/21/20 122/84   02/12/20 124/88     Answers for HPI/ROS submitted by the patient on 12/29/2022  Since last visit, patient describes the following symptoms:: Fatigue, Hair loss, Weight gain  Weight gain:: 11-15 lbs.  What is the reason for your visit today? : meds refill   ear infection   add diagnosis  How many servings of fruits and vegetables do you eat daily?: 0-1  On average, how many sweetened beverages do you drink each day (Examples: soda, juice, sweet tea, etc.  Do NOT count diet or artificially sweetened beverages)?: 0  How many minutes a day do you exercise enough to make your heart beat faster?: 10 to 19  How many days a week do you exercise enough to make your heart beat faster?: 4  How many days  per week do you miss taking your medication?: 0         PMHX:     Patient Active Problem List   Diagnosis     Obesity     Hypothyroidism     Routine general medical examination at a health care facility     Overactive bladder     Secondary hyperparathyroidism (H)       Social History     Tobacco Use     Smoking status: Never     Passive exposure: Never     Smokeless tobacco: Never   Substance Use Topics     Alcohol use: Yes     Alcohol/week: 2.0 standard drinks       Social History     Social History Narrative     Not on file       No Known Allergies    No results found for this or any previous visit (from the past 24 hour(s)).         Review of Systems:    ROS: 10 point ROS neg other than the symptoms noted above in the HPI.         Physical Exam:     Vitals:    12/29/22 1659   BP: (!) 142/102   BP Location: Left arm   Patient Position: Sitting   Cuff Size: Adult Regular   Pulse: 92   Resp: 20   Temp: 97  F (36.1  C)   TempSrc: Temporal   SpO2: 97%   Weight: 126.9 kg (279 lb 11.2 oz)     Body mass index is 34.96 kg/m .    General appearance: Alert, cooperative, no distress, appears stated age  Head: Normocephalic, atraumatic, without obvious abnormality  Eyes: Pupils equal round, reactive.  Conjunctiva clear.  Ears:  No cerumen, TM's grey dull with structures seen bilaterally  Nose: Nares normal, no drainage.  Throat: Lips, mucosa, tongue normal mucosa pink and moist  Neck: Supple, symmetric, trachea midline

## 2023-01-02 NOTE — RESULT ENCOUNTER NOTE
Cecil,  Your results from your recent clinic visit show:  Your urine drug screen was only positive for cannabinoids which I am not concerned about.     If you have more questions please call the clinic at [phone] or send me a xkoto message    Dr. Jame Colunga

## 2023-01-04 ENCOUNTER — TRANSFERRED RECORDS (OUTPATIENT)
Dept: HEALTH INFORMATION MANAGEMENT | Facility: CLINIC | Age: 48
End: 2023-01-04

## 2023-01-24 ENCOUNTER — TELEPHONE (OUTPATIENT)
Dept: FAMILY MEDICINE | Facility: CLINIC | Age: 48
End: 2023-01-24

## 2023-01-24 ENCOUNTER — VIRTUAL VISIT (OUTPATIENT)
Dept: FAMILY MEDICINE | Facility: CLINIC | Age: 48
End: 2023-01-24
Payer: COMMERCIAL

## 2023-01-24 DIAGNOSIS — F90.9 ATTENTION DEFICIT HYPERACTIVITY DISORDER (ADHD), UNSPECIFIED ADHD TYPE: ICD-10-CM

## 2023-01-24 PROCEDURE — 99214 OFFICE O/P EST MOD 30 MIN: CPT | Mod: GT | Performed by: STUDENT IN AN ORGANIZED HEALTH CARE EDUCATION/TRAINING PROGRAM

## 2023-01-24 RX ORDER — DEXTROAMPHETAMINE SACCHARATE, AMPHETAMINE ASPARTATE MONOHYDRATE, DEXTROAMPHETAMINE SULFATE AND AMPHETAMINE SULFATE 5; 5; 5; 5 MG/1; MG/1; MG/1; MG/1
20 CAPSULE, EXTENDED RELEASE ORAL DAILY
Qty: 30 CAPSULE | Refills: 0 | Status: SHIPPED | OUTPATIENT
Start: 2023-01-24 | End: 2023-01-24

## 2023-01-24 RX ORDER — DEXTROAMPHETAMINE SACCHARATE, AMPHETAMINE ASPARTATE MONOHYDRATE, DEXTROAMPHETAMINE SULFATE AND AMPHETAMINE SULFATE 2.5; 2.5; 2.5; 2.5 MG/1; MG/1; MG/1; MG/1
20 CAPSULE, EXTENDED RELEASE ORAL DAILY
Qty: 60 CAPSULE | Refills: 0 | Status: SHIPPED | OUTPATIENT
Start: 2023-01-24 | End: 2023-03-06

## 2023-01-24 NOTE — PROGRESS NOTES
Assessment & Plan     47-year-old male with past [place] of ADHD who presents for follow-up regarding this.  Patient really has not noticed a difference with Adderall XR 10 mg daily.  Discussed that this is a low dose and we will increase from here.  We will go up to 20 mg daily and follow-up in a month to see how he is doing.  Has not had any side effects yet.    1. Attention deficit hyperactivity disorder (ADHD), unspecified ADHD type  - amphetamine-dextroamphetamine (ADDERALL XR) 20 MG 24 hr capsule; Take 1 capsule (20 mg) by mouth daily  Dispense: 30 capsule; Refill: 0    Subjective   Chief Complaint   Patient presents with     Recheck Medication     ADHD     HPI     ADHD:  Patient noticing difficulty with organization and completing tasks to end.  He had ADHD testing through [facility] which we will records from.  Diagnosed with ADHD.  Started him on medication in December 2022.  Originally started on 10 mg Adderall XR daily.    Today:  Patient has noticed mild improvement with adderral 10 mg daily. Targeting organization and completing task to completion. No side effects. He took his blood pressure and was 131/76.       Objective         Vitals:  No vitals were obtained today due to virtual visit.    Physical Exam   GENERAL: Healthy, alert and no distress  EYES: Eyes grossly normal to inspection.  No discharge or erythema, or obvious scleral/conjunctival abnormalities.  RESP: No audible wheeze, cough, or visible cyanosis.  No visible retractions or increased work of breathing.    SKIN: Visible skin clear. No significant rash, abnormal pigmentation or lesions.  NEURO: Cranial nerves grossly intact.  Mentation and speech appropriate for age.  PSYCH: Mentation appears normal, affect normal/bright, judgement and insight intact, normal speech and appearance well-groomed.      Video-Visit Details    Type of service:  Video Visit     Start time: 9:15 AM  End time: 9:23 AM    Originating Location (pt. Location):  Home  Distant Location (provider location):  On-site  Platform used for Video Visit: Richard

## 2023-01-24 NOTE — TELEPHONE ENCOUNTER
Pharmacy called per pt request for Adderall 10mg capsules instead of 20mg. Pt says it's more cost effective to get 10mg caps.

## 2023-01-24 NOTE — TELEPHONE ENCOUNTER
Pharmacy dispensed 20mg XR capsules and will deactivate 10mg XR capsules after finding out 20mg was cheaper with GoodRx coupon.

## 2023-03-06 ENCOUNTER — MYC MEDICAL ADVICE (OUTPATIENT)
Dept: FAMILY MEDICINE | Facility: CLINIC | Age: 48
End: 2023-03-06
Payer: COMMERCIAL

## 2023-03-06 DIAGNOSIS — F90.9 ATTENTION DEFICIT HYPERACTIVITY DISORDER (ADHD), UNSPECIFIED ADHD TYPE: ICD-10-CM

## 2023-03-06 RX ORDER — DEXTROAMPHETAMINE SACCHARATE, AMPHETAMINE ASPARTATE MONOHYDRATE, DEXTROAMPHETAMINE SULFATE AND AMPHETAMINE SULFATE 2.5; 2.5; 2.5; 2.5 MG/1; MG/1; MG/1; MG/1
20 CAPSULE, EXTENDED RELEASE ORAL DAILY
Qty: 60 CAPSULE | Refills: 0 | Status: SHIPPED | OUTPATIENT
Start: 2023-03-06 | End: 2023-05-04

## 2023-03-06 NOTE — TELEPHONE ENCOUNTER
Medication last filled:     Last clinic visit: 01/24/2023    Clinic visit frequency required: Q 6  months    Next clinic visit: 4/4/2023    Controlled substance agreement on file: No.    Urine Drug Screen on file:  Yes     Pending Prescriptions:                       Disp   Refills    amphetamine-dextroamphetamine (ADDERALL X*60 cap*0            Sig: Take 2 capsules (20 mg) by mouth daily

## 2023-05-02 DIAGNOSIS — N52.9 ERECTILE DYSFUNCTION, UNSPECIFIED ERECTILE DYSFUNCTION TYPE: ICD-10-CM

## 2023-05-03 RX ORDER — SILDENAFIL 50 MG/1
TABLET, FILM COATED ORAL
Qty: 60 TABLET | Refills: 0 | OUTPATIENT
Start: 2023-05-03

## 2023-05-03 NOTE — TELEPHONE ENCOUNTER
Refused as there should already be a refill on file.   Filled 12/29/2022 disp 60 with 3 refills  Stephanie Olmos RN   05/03/23 12:41 PM  [facility] Nurse Advisor

## 2023-05-04 ENCOUNTER — OFFICE VISIT (OUTPATIENT)
Dept: FAMILY MEDICINE | Facility: CLINIC | Age: 48
End: 2023-05-04
Payer: COMMERCIAL

## 2023-05-04 VITALS
BODY MASS INDEX: 35 KG/M2 | SYSTOLIC BLOOD PRESSURE: 120 MMHG | HEIGHT: 76 IN | DIASTOLIC BLOOD PRESSURE: 80 MMHG | TEMPERATURE: 98.2 F | OXYGEN SATURATION: 96 % | WEIGHT: 287.44 LBS | HEART RATE: 92 BPM

## 2023-05-04 DIAGNOSIS — F90.9 ATTENTION DEFICIT HYPERACTIVITY DISORDER (ADHD), UNSPECIFIED ADHD TYPE: Primary | ICD-10-CM

## 2023-05-04 DIAGNOSIS — N52.9 ERECTILE DYSFUNCTION, UNSPECIFIED ERECTILE DYSFUNCTION TYPE: ICD-10-CM

## 2023-05-04 PROCEDURE — 99214 OFFICE O/P EST MOD 30 MIN: CPT | Performed by: STUDENT IN AN ORGANIZED HEALTH CARE EDUCATION/TRAINING PROGRAM

## 2023-05-04 RX ORDER — SILDENAFIL 50 MG/1
50 TABLET, FILM COATED ORAL DAILY PRN
Qty: 60 TABLET | Refills: 3 | Status: CANCELLED | OUTPATIENT
Start: 2023-05-04

## 2023-05-04 RX ORDER — DEXTROAMPHETAMINE SACCHARATE, AMPHETAMINE ASPARTATE MONOHYDRATE, DEXTROAMPHETAMINE SULFATE AND AMPHETAMINE SULFATE 7.5; 7.5; 7.5; 7.5 MG/1; MG/1; MG/1; MG/1
30 CAPSULE, EXTENDED RELEASE ORAL DAILY
Qty: 30 CAPSULE | Refills: 0 | Status: SHIPPED | OUTPATIENT
Start: 2023-05-04 | End: 2023-06-08

## 2023-05-04 RX ORDER — SILDENAFIL 50 MG/1
50-100 TABLET, FILM COATED ORAL DAILY PRN
Qty: 180 TABLET | Refills: 3 | Status: SHIPPED | OUTPATIENT
Start: 2023-05-04 | End: 2024-01-05

## 2023-05-04 ASSESSMENT — PAIN SCALES - GENERAL: PAINLEVEL: NO PAIN (0)

## 2023-05-04 NOTE — PROGRESS NOTES
Assessment and Plan   48-year-old male with past with history of ADHD who I am seeing in follow-up regarding this.  Patient increased to 20 mg Adderall on his last visit a couple of months ago.  He finds increased dose helpful but still thinks it may be too low.  He would like to try going up to 30 mg.  He is not having any side effects such as blood pressure issues, insomnia, and decreased appetite.  Reasonable to trial this.  Increase to 30.  He will stay on this if working well and see me in 6 months otherwise if he wishes to change dose again I asked him to make another appointment before this.  He is up-to-date on his CSA and urine drug screen.    1. Attention deficit hyperactivity disorder (ADHD), unspecified ADHD type  - amphetamine-dextroamphetamine (ADDERALL XR) 30 MG 24 hr capsule; Take 1 capsule (30 mg) by mouth daily  Dispense: 30 capsule; Refill: 0    2. Erectile dysfunction, unspecified erectile dysfunction type  - sildenafil (VIAGRA) 50 MG tablet; Take 1-2 tablets ( mg) by mouth daily as needed (erectile dysfunction)  Dispense: 180 tablet; Refill: 3    Follow up: 6 months for ADHD virtually or in person    Options for treatment and follow-up care were reviewed with the patient and/or guardian. Cecil ADEN Ramirokareem and/or guardian engaged in the decision making process and verbalized understanding of the options discussed and agreed with the final plan.    Dr. Jame Colunga         HPI:   Cecil Suazoandrewge[place] is a 48 year old  male who presents for:    Chief Complaint   Patient presents with     Physical     ADD Overview  Hx:  Diagnosed through [facility].  Have records of this.  Never started medication.  We discussed risks and benefits and he wishes to trial this.  Originally started him on 10 mg Adderall daily XR which did not seem to be high enough dose.  Increase to 20.  Today no side effects such as decreased appetite, insomnia, blood pressure normal today    Weight:  Wt Readings from Last 2  "Encounters:   05/04/23 130.4 kg (287 lb 7 oz)   12/29/22 126.9 kg (279 lb 11.2 oz)        BP:  BP Readings from Last 6 Encounters:   05/04/23 120/80   12/29/22 (!) 142/102   07/25/22 124/82   03/31/21 129/78   10/21/20 122/84   02/12/20 124/88       Medication:  adderral 20 mg XR daily    Urine Drug Screen:  12/22    CSA:  Previously completed           PMHX:     Patient Active Problem List   Diagnosis     Obesity     Hypothyroidism     Secondary hyperparathyroidism (H)     REBEKAH (generalized anxiety disorder)     Attention deficit hyperactivity disorder (ADHD), unspecified ADHD type     Generalized hyperhidrosis     Male pattern baldness     Erectile dysfunction, unspecified erectile dysfunction type       Social History     Tobacco Use     Smoking status: Never     Passive exposure: Never     Smokeless tobacco: Never   Substance Use Topics     Alcohol use: Yes     Alcohol/week: 2.0 standard drinks of alcohol     Drug use: Yes     Types: Marijuana       Social History     Social History Narrative     Not on file       No Known Allergies    No results found for this or any previous visit (from the past 24 hour(s)).         Review of Systems:    ROS: 10 point ROS neg other than the symptoms noted above in the HPI.         Physical Exam:     Vitals:    05/04/23 1244   BP: 120/80   Pulse: 92   Temp: 98.2  F (36.8  C)   SpO2: 96%   Weight: 130.4 kg (287 lb 7 oz)   Height: 1.93 m (6' 4\")     Body mass index is 34.99 kg/m .    General appearance: Alert, cooperative, no distress, appears stated age  Head: Normocephalic, atraumatic, without obvious abnormality  Eyes: Pupils equal round, reactive.  Conjunctiva clear.  Nose: Nares normal, no drainage.  Throat: Lips, mucosa, tongue normal mucosa pink and moist  Neck: Supple, symmetric, trachea midline,            "

## 2023-05-15 ENCOUNTER — MYC MEDICAL ADVICE (OUTPATIENT)
Dept: FAMILY MEDICINE | Facility: CLINIC | Age: 48
End: 2023-05-15
Payer: COMMERCIAL

## 2023-05-15 DIAGNOSIS — N52.9 ERECTILE DYSFUNCTION, UNSPECIFIED ERECTILE DYSFUNCTION TYPE: Primary | ICD-10-CM

## 2023-05-15 RX ORDER — TADALAFIL 20 MG/1
20 TABLET ORAL DAILY PRN
Qty: 30 TABLET | Refills: 3 | Status: SHIPPED | OUTPATIENT
Start: 2023-05-15

## 2023-05-26 ENCOUNTER — TRANSFERRED RECORDS (OUTPATIENT)
Dept: HEALTH INFORMATION MANAGEMENT | Facility: CLINIC | Age: 48
End: 2023-05-26
Payer: COMMERCIAL

## 2023-05-26 NOTE — TELEPHONE ENCOUNTER
Reason for call:  Other     Patient called regarding (reason for call): prescription    Additional comments: Pharmacy is calling and wondering if this patient suppose to get both of these medications Sildenafil and tadalafil. Pharmacy states they don't feel comfortable filling both of these. Please advise and call pharmacy back please and thank you.    Phone number to reach patient:  Other phone number:  [phone]    Best Time:  Any

## 2023-06-08 ENCOUNTER — MYC REFILL (OUTPATIENT)
Dept: FAMILY MEDICINE | Facility: CLINIC | Age: 48
End: 2023-06-08
Payer: COMMERCIAL

## 2023-06-08 DIAGNOSIS — F90.9 ATTENTION DEFICIT HYPERACTIVITY DISORDER (ADHD), UNSPECIFIED ADHD TYPE: ICD-10-CM

## 2023-06-08 RX ORDER — DEXTROAMPHETAMINE SACCHARATE, AMPHETAMINE ASPARTATE MONOHYDRATE, DEXTROAMPHETAMINE SULFATE AND AMPHETAMINE SULFATE 7.5; 7.5; 7.5; 7.5 MG/1; MG/1; MG/1; MG/1
30 CAPSULE, EXTENDED RELEASE ORAL DAILY
Qty: 30 CAPSULE | Refills: 0 | Status: SHIPPED | OUTPATIENT
Start: 2023-06-08 | End: 2023-07-21

## 2023-06-08 NOTE — TELEPHONE ENCOUNTER
Medication last filled:     Last clinic visit: 5/4/2023    Clinic visit frequency required: Q 6  months    Next clinic visit: N/A    Controlled substance agreement on file: No.    Urine Drug Screen on file:  Yes     Pending Prescriptions:                       Disp   Refills    amphetamine-dextroamphetamine (ADDERALL X*30 cap*0            Sig: Take 1 capsule (30 mg) by mouth daily

## 2023-07-21 ENCOUNTER — MYC REFILL (OUTPATIENT)
Dept: FAMILY MEDICINE | Facility: CLINIC | Age: 48
End: 2023-07-21
Payer: COMMERCIAL

## 2023-07-21 DIAGNOSIS — F90.9 ATTENTION DEFICIT HYPERACTIVITY DISORDER (ADHD), UNSPECIFIED ADHD TYPE: ICD-10-CM

## 2023-07-21 NOTE — TELEPHONE ENCOUNTER
Routing refill request to provider for review/approval because:  Drug not on the [facility] refill protocol- controlled substance    Please fill in with dates, using N/A if not applicable:    Urine Drug Screen in the last 12 months - 12/22    Controlled Substance Agreement in the last 12 months (Must be scanned to the ControlledSubstance Agreement-Opioid document type) See Controlled Substance Agreement Opioid Procedure for workflow- NA    PHQ-9 completed in the last 12 months - No    REBEKAH-7 completed in the last 12 months- No    Pending Prescriptions:                       Disp   Refills    amphetamine-dextroamphetamine (ADDERALL X*30 cap*0            Sig: Take 1 capsule (30 mg) by mouth daily    Last office visitwith prescribing provider: 5/4/2023   Future Office Visit:      JEET Beard, RN  [facility]

## 2023-07-24 RX ORDER — DEXTROAMPHETAMINE SACCHARATE, AMPHETAMINE ASPARTATE MONOHYDRATE, DEXTROAMPHETAMINE SULFATE AND AMPHETAMINE SULFATE 7.5; 7.5; 7.5; 7.5 MG/1; MG/1; MG/1; MG/1
30 CAPSULE, EXTENDED RELEASE ORAL DAILY
Qty: 30 CAPSULE | Refills: 0 | Status: SHIPPED | OUTPATIENT
Start: 2023-07-24 | End: 2023-09-01

## 2023-08-06 ENCOUNTER — HOSPITAL ENCOUNTER (EMERGENCY)
Facility: CLINIC | Age: 48
Discharge: HOME | End: 2023-08-06
Attending: EMERGENCY MEDICINE | Admitting: EMERGENCY MEDICINE
Payer: COMMERCIAL

## 2023-08-06 ENCOUNTER — NURSE TRIAGE (OUTPATIENT)
Dept: NURSING | Facility: CLINIC | Age: 48
End: 2023-08-06
Payer: COMMERCIAL

## 2023-08-06 VITALS
BODY MASS INDEX: 34.1 KG/M2 | SYSTOLIC BLOOD PRESSURE: 157 MMHG | TEMPERATURE: 97.5 F | HEIGHT: 76 IN | RESPIRATION RATE: 18 BRPM | OXYGEN SATURATION: 96 % | HEART RATE: 80 BPM | WEIGHT: 280 LBS | DIASTOLIC BLOOD PRESSURE: 107 MMHG

## 2023-08-06 DIAGNOSIS — M54.12 CERVICAL RADICULOPATHY: ICD-10-CM

## 2023-08-06 PROCEDURE — 99284 EMERGENCY DEPT VISIT MOD MDM: CPT

## 2023-08-06 PROCEDURE — 93005 ELECTROCARDIOGRAM TRACING: CPT | Performed by: EMERGENCY MEDICINE

## 2023-08-06 RX ORDER — PREDNISONE 20 MG/1
TABLET ORAL
Qty: 10 TABLET | Refills: 0 | Status: SHIPPED | OUTPATIENT
Start: 2023-08-06 | End: 2023-09-19

## 2023-08-06 RX ORDER — CYCLOBENZAPRINE HCL 10 MG
10 TABLET ORAL 3 TIMES DAILY PRN
Qty: 15 TABLET | Refills: 0 | Status: SHIPPED | OUTPATIENT
Start: 2023-08-06 | End: 2023-08-11

## 2023-08-06 NOTE — TELEPHONE ENCOUNTER
"Has crazy pain in left arm in joints, tingling in fingers. Arm pain for awhile. Pain is strong today. Kinked neck too. Has HTN. Has appointment on the 8th. Should he go in?  He is already at the ER and will go in now.  Nicki Talavera RN  [facility]    Reason for Disposition   Weakness of an arm or hand    Additional Information   Negative: Shock suspected (e.g., cold/pale/clammy skin, too weak to stand, low BP, rapid pulse)   Negative: Difficult to awaken or acting confused (e.g., disoriented, slurred speech)   Negative: [1] Similar pain previously AND [2] it was from \"heart attack\"   Negative: [1] Similar pain previously AND [2] it was from \"angina\" AND [3] not relieved by nitroglycerin   Negative: Sounds like a life-threatening emergency to the triager   Negative: Followed a neck injury (e.g., MVA, sports, impact or collision)   Negative: Chest pain   Negative: Lymph node in the neck is swollen or painful to the touch   Negative: Sore throat is main symptom   Negative: Difficulty breathing or unusual sweating (e.g., sweating without exertion)   Negative: [1] Stiff neck (can't put chin to chest) AND [2] headache     Can't put chin to chest, no headache   Negative: [1] Stiff neck (can't put chin to chest) AND [2] fever    Protocols used: Neck Pain or [id number]-SG    "

## 2023-08-06 NOTE — ED PROVIDER NOTES
EMERGENCY DEPARTMENT ENCOUNTER      NAME: Cecil Moreira  AGE: 48 year old male  YOB: 1975  MRN: [id number]  EVALUATION DATE & TIME: 8/6/2023  2:20 PM    PCP: No Ref-Primary, Physician    ED PROVIDER: Giuliana Bean M.D.      Chief Complaint   Patient presents with    Arm Pain    Shoulder Pain    Neck Pain       FINAL IMPRESSION:  1. Cervical radiculopathy        ED COURSE & MEDICAL DECISION MAKING:    Pertinent Labs & Imaging studies reviewed. (See chart for details)  48 year old male presents to the Emergency Department for evaluation of left-sided neck pain, left arm pain and some tingling and numbness in his second, third, fourth fingertips.  He reports that he awoke 2 days ago with his left-sided neck pain.  Since then, his symptoms have progressed.  He denies any chest pain, shortness of breath.  Denies any trauma or change in activity.  He denies any associated dizziness.  He denies any constitutional symptoms.  On my exam, patient has symmetric strength in his upper extremities, no significant decrease sensation in the left upper extremity.  He has point tenderness in the left paraspinal neck.  No midline spinal tenderness.  I suspect that the patient has a cervical radiculopathy.  I discussed with them that my suspicion for ACS is low.  ECG is unremarkable.  I discussed with him that CVA is also unlikely as the symptoms appear secondary to pain in the left side of the neck radiating into the left upper extremity.  I do not suspect DVT in the left upper extremity.  I do not suspect a vascular etiology for his symptoms.  I discussed management of cervical radiculopathy with oral steroids, muscle relaxants, referral to spine and also recommendation for follow-up with primary care provider as well.  Patient is comfortable with this plan.    2:30 PM I met with the patient, obtained history, performed an initial exam, and discussed options and plan for diagnostics and treatment here in the  ED.    At the conclusion of the encounter I discussed the results of all of the tests and the disposition. The questions were answered. The patient or family acknowledged understanding and was agreeable with the care plan.    Medical Decision Making    History:  Supplemental history from: Documented in chart, if applicable  External Record(s) reviewed: Documented in chart, if applicable.    Work Up:  Chart documentation includes differential considered and any EKGs or imaging independently interpreted by provider, where specified.  In additional to work up documented, I considered the following work up: Documented in chart, if applicable.    External consultation:  Discussion of management with another provider: Documented in chart, if applicable    Complicating factors:  Care impacted by chronic illness: N/A  Care affected by social determinants of health: N/A    Disposition considerations: Discharge. I prescribed additional prescription strength medication(s) as charted. See documentation for any additional details.      MEDICATIONS GIVEN IN THE EMERGENCY:  Medications - No data to display    NEW PRESCRIPTIONS STARTED AT TODAY'S ER VISIT  Discharge Medication List as of 8/6/2023  2:45 PM        START taking these medications    Details   cyclobenzaprine (FLEXERIL) 10 MG tablet Take 1 tablet (10 mg) by mouth 3 times daily as needed for muscle spasms, Disp-15 tablet, R-0, E-Prescribe      predniSONE (DELTASONE) 20 MG tablet Take two tablets (= 40mg) each day for 5 (five) days, Disp-10 tablet, R-0, E-Prescribe           =================================================================    HPI    Patient information was obtained from: patient    Use of : N/A    Cecil Moreira is a 48 year old male with a pertinent history of anxiety, obesity, hypothyroidism, who presents to this ED by private car with  for evaluation of left sided neck and arm pain. Patient reports 2 days of left sided neck pain  that radiates down his left arm with numbness and tingling in the fingertips of his left hand. The pain started when he woke up 2 days ago. No injuries or trauma. Denies any change in pain with certain positions or movement. Notes that he does also feel weak in his left hand. Denies any lightheadedness, dizziness, or vision changes.    Patient otherwise denies fevers, chills, sweats, or recent illnesses. Denies any other complaints or concerns.      PAST MEDICAL HISTORY:  History reviewed. No pertinent past medical history.    PAST SURGICAL HISTORY:  Past Surgical History:   Procedure Laterality Date    [facility] APPENDECTOMY      Description: Appendectomy;  Recorded: 09/09/2008;  Comments: 1990.       CURRENT MEDICATIONS:    cyclobenzaprine (FLEXERIL) 10 MG tablet  predniSONE (DELTASONE) 20 MG tablet  amphetamine-dextroamphetamine (ADDERALL XR) 30 MG 24 hr capsule  buPROPion (WELLBUTRIN XL) 300 MG 24 hr tablet  finasteride (PROPECIA) 1 MG tablet  glycopyrrolate (ROBINUL) 1 MG tablet  levothyroxine (SYNTHROID/LEVOTHROID) 150 MCG tablet  meloxicam (MOBIC) 7.5 MG tablet  sildenafil (VIAGRA) 50 MG tablet  tadalafil (ADCIRCA/CIALIS) 20 MG tablet      ALLERGIES:  No Known Allergies    FAMILY HISTORY:  Family History   Problem Relation Age of Onset    Coronary Artery Disease Mother     Hypertension Mother     Diabetes Type 2  Mother     Benign prostatic hyperplasia Mother     Heart Disease Father         Afib with Valvular replacement.    Thyroid Disease Father     Thyroid Disease Paternal Aunt     Alzheimer Disease Paternal Grandmother        SOCIAL HISTORY:   Social History     Socioeconomic History    Marital status:    Tobacco Use    Smoking status: Never     Passive exposure: Never    Smokeless tobacco: Never   Substance and Sexual Activity    Alcohol use: Yes     Alcohol/week: 2.0 standard drinks of alcohol    Drug use: Yes     Types: Marijuana    Sexual activity: Yes     Partners: Female       VITALS:  BP (!)  "157/107   Pulse 80   Temp 97.5  F (36.4  C)   Resp 18   Ht 1.93 m (6' 4\")   Wt 127 kg (280 lb)   SpO2 96%   BMI 34.08 kg/m      PHYSICAL EXAM   Constitutional: Well developed, Well nourished, NAD  HENT: Normocephalic, Atraumatic, Bilateral external ears normal, Oropharynx normal, mucous membranes moist, Nose normal.   Neck- Normal range of motion, No tenderness, Supple, No stridor.  Eyes: PERRL, EOMI, Conjunctiva normal, No discharge.   Respiratory: Normal breath sounds, No respiratory distress  Cardiovascular: Normal heart rate, Regular rhythm. Symmetric radial pulses.  Musculoskeletal: No edema. Good range of motion in all major joints. No major deformities noted. Point tenderness in the left paraspinal cervical neck. Symmetric strength in the upper extremities. Normal sensation in upper extremities. No midline spinal tenderness.  Integument: Warm, Dry, No erythema, No rash  Neurologic: Alert & oriented x 3, Normal motor function, Normal sensory function, No focal deficits noted. Normal gait.   Psychiatric: Affect normal, Judgment normal, Mood normal.       LAB:  All pertinent labs reviewed and interpreted.       RADIOLOGY:  Reviewed all pertinent imaging. Please see official radiology report.  No orders to display       EKG:    Performed at: 14:13    Impression: Sinus rhythm. Minimal voltage criteria for LVH, may be normal variant.    Rate: 80 bpm  Rhythm: Sinus  Axis: 47,-26, 25  MA Interval: 190  QRS Interval: 102  QTc Interval: 433  ST Changes: None  Comparison: No significant changes compared with EKG from 5/1/2017.    I have independently reviewed and interpreted the EKG(s) documented above.    PROCEDURES:   None.      I, Jerrod Vieira, am serving as a scribe to document services personally performed by Dr. Giuliana Bean MD, based on my observation and the provider's statements to me. I, Dr. Giuliana Bean MD attest that Jerrod Vieira is acting in a scribe capacity, has observed my performance " of the services and has documented them in accordance with my direction.    Giuliana Bean M.D.  Emergency Medicine  [facility] EMERGENCY ROOM  [address] [id number] [phone]  Dept: [phone]     Giuliana Bean MD  08/06/23 2051

## 2023-08-06 NOTE — ED TRIAGE NOTES
Patient started to have tingling in fingers around 0900 and followed with shoulder and neck pain.  Has taken Viagra within 48 hours.

## 2023-08-07 LAB
ATRIAL RATE - MUSE: 80 BPM
DIASTOLIC BLOOD PRESSURE - MUSE: NORMAL MMHG
INTERPRETATION ECG - MUSE: NORMAL
P AXIS - MUSE: 47 DEGREES
PR INTERVAL - MUSE: 190 MS
QRS DURATION - MUSE: 102 MS
QT - MUSE: 376 MS
QTC - MUSE: 433 MS
R AXIS - MUSE: -26 DEGREES
SYSTOLIC BLOOD PRESSURE - MUSE: NORMAL MMHG
T AXIS - MUSE: 25 DEGREES
VENTRICULAR RATE- MUSE: 80 BPM

## 2023-08-08 ENCOUNTER — OFFICE VISIT (OUTPATIENT)
Dept: FAMILY MEDICINE | Facility: CLINIC | Age: 48
End: 2023-08-08
Payer: COMMERCIAL

## 2023-08-08 VITALS
HEIGHT: 76 IN | HEART RATE: 82 BPM | SYSTOLIC BLOOD PRESSURE: 150 MMHG | DIASTOLIC BLOOD PRESSURE: 100 MMHG | WEIGHT: 287.04 LBS | BODY MASS INDEX: 34.95 KG/M2 | OXYGEN SATURATION: 97 % | TEMPERATURE: 98.2 F

## 2023-08-08 DIAGNOSIS — M54.12 CERVICAL RADICULOPATHY: Primary | ICD-10-CM

## 2023-08-08 DIAGNOSIS — R00.2 PALPITATIONS: ICD-10-CM

## 2023-08-08 PROCEDURE — 99214 OFFICE O/P EST MOD 30 MIN: CPT | Performed by: STUDENT IN AN ORGANIZED HEALTH CARE EDUCATION/TRAINING PROGRAM

## 2023-08-08 RX ORDER — GABAPENTIN 300 MG/1
300-600 CAPSULE ORAL 3 TIMES DAILY PRN
Qty: 90 CAPSULE | Refills: 0 | Status: SHIPPED | OUTPATIENT
Start: 2023-08-08 | End: 2024-04-10

## 2023-08-08 ASSESSMENT — PAIN SCALES - GENERAL: PAINLEVEL: NO PAIN (1)

## 2023-08-08 NOTE — PROGRESS NOTES
Assessment and Plan   48-year-old male with past medical history of hypothyroidism, ADHD, generalized anxiety disorder who presents with a couple of concerns as below.    1. Cervical radiculopathy  Started a couple days ago patient seen in the ER.  Thought to be cervical radiculopathy and has appointments with spine physician later this month.  Struggling with pain control and we will have him trial some gabapentin to use as needed.  Also sent a referral to physical therapy.  - Physical Therapy Referral; Future  - gabapentin (NEURONTIN) 300 MG capsule; Take 1-2 capsules (300-600 mg) by mouth 3 times daily as needed for neuropathic pain  Dispense: 90 capsule; Refill: 0    2. Palpitations  Patient noticing palpitations almost daily recently but cannot tell me exactly how long.  Occurs multiple times a day.  Somewhat concerning for arrhythmia given his watch is flaging for irregular heartbeat.  Blood pressures have also been elevated lately.  I recommended a heart monitor for further workup.  Will review results when they return.  - Adult Cardiac Event Monitor; Future    Follow up: pending results    Options for treatment and follow-up care were reviewed with the patient and/or guardian. Cecil Moreira and/or guardian engaged in the decision making process and verbalized understanding of the options discussed and agreed with the final plan.    Dr. Jame Colunga         HPI:   Cecil Moreira is a 48 year old  male who presents for:    Chief Complaint   Patient presents with    Hypertension     BP Readings from Last 6 Encounters:   08/08/23 (!) 150/100   08/06/23 (!) 157/107   05/04/23 120/80   12/29/22 (!) 142/102   07/25/22 124/82   03/31/21 129/78     Patient has been feeling palpitations. He has been there  at least he last week but maybe longer. He cannot tell. Occurring multiple times a day.  Not associate with chest pain.  Some shortness of breath.  Lasts for a couple minutes at a time.  He has a watch that  "monitors his heart rhythm and has been flaging irregular rhythms.  His blood pressures have also been high including today at 150/100.    Patient also tells me he recently went to the ER for numbness going down his left arm.  He was evaluated there and told he had cervical radiculopathy.  They put him on a course of steroids as well as muscle relaxers.  These are not really helping his symptoms.  He has a referral for a spine physician that he sees in the later August.  He is struggling with pain control but does not want to trial opioids.  We did discuss gabapentin and he is interested in this.  Also interested in going to physical therapy in the meantime.         PMHX:     Patient Active Problem List   Diagnosis    Obesity    Hypothyroidism    Secondary hyperparathyroidism (H)    REBEKAH (generalized anxiety disorder)    Attention deficit hyperactivity disorder (ADHD), unspecified ADHD type    Generalized hyperhidrosis    Male pattern baldness    Erectile dysfunction, unspecified erectile dysfunction type       Social History     Tobacco Use    Smoking status: Never     Passive exposure: Never    Smokeless tobacco: Never   Substance Use Topics    Alcohol use: Yes     Alcohol/week: 2.0 standard drinks of alcohol    Drug use: Yes     Types: Marijuana       Social History     Social History Narrative    Not on file       No Known Allergies    No results found for this or any previous visit (from the past 24 hour(s)).         Review of Systems:    ROS: 10 point ROS neg other than the symptoms noted above in the HPI.         Physical Exam:     Vitals:    08/08/23 1024   BP: (!) 150/100   Pulse: 82   Temp: 98.2  F (36.8  C)   SpO2: 97%   Weight: 130.2 kg (287 lb 0.6 oz)   Height: 1.93 m (6' 4\")     Body mass index is 34.94 kg/m .    General appearance: Alert, cooperative, no distress, appears stated age  Head: Normocephalic, atraumatic, without obvious abnormality  Eyes: Pupils equal round, reactive.  Conjunctiva " clear.  Nose: Nares normal, no drainage.  Throat: Lips, mucosa, tongue normal mucosa pink and moist  Neck: Supple, symmetric, trachea midline,

## 2023-08-12 ENCOUNTER — HEALTH MAINTENANCE LETTER (OUTPATIENT)
Age: 48
End: 2023-08-12
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
## 2023-08-24 ENCOUNTER — OFFICE VISIT (OUTPATIENT)
Dept: PHYSICAL MEDICINE AND REHAB | Facility: CLINIC | Age: 48
End: 2023-08-24
Attending: EMERGENCY MEDICINE
Payer: COMMERCIAL

## 2023-08-24 ENCOUNTER — ANCILLARY PROCEDURE (OUTPATIENT)
Dept: GENERAL RADIOLOGY | Facility: CLINIC | Age: 48
End: 2023-08-24
Attending: NURSE PRACTITIONER
Payer: COMMERCIAL

## 2023-08-24 ENCOUNTER — DOCUMENTATION ONLY (OUTPATIENT)
Dept: PHYSICAL MEDICINE AND REHAB | Facility: CLINIC | Age: 48
End: 2023-08-24

## 2023-08-24 VITALS — OXYGEN SATURATION: 94 % | HEART RATE: 94 BPM | SYSTOLIC BLOOD PRESSURE: 131 MMHG | DIASTOLIC BLOOD PRESSURE: 84 MMHG

## 2023-08-24 DIAGNOSIS — M79.602 PAIN OF LEFT UPPER EXTREMITY: Primary | ICD-10-CM

## 2023-08-24 DIAGNOSIS — M54.2 NECK PAIN: ICD-10-CM

## 2023-08-24 DIAGNOSIS — M54.12 CERVICAL RADICULOPATHY: ICD-10-CM

## 2023-08-24 DIAGNOSIS — M79.602 PAIN OF LEFT UPPER EXTREMITY: ICD-10-CM

## 2023-08-24 PROCEDURE — 72040 X-RAY EXAM NECK SPINE 2-3 VW: CPT | Mod: GC | Performed by: RADIOLOGY

## 2023-08-24 PROCEDURE — 99205 OFFICE O/P NEW HI 60 MIN: CPT | Performed by: NURSE PRACTITIONER
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
## 2023-08-24 NOTE — PATIENT INSTRUCTIONS
It was a pleasure seeing you in the clinic today.  As discussed, we made the following updates to your plan of care:       An XR order was added today.  I will send you a message about the x-ray result through [facility].     A MRI order was added today.  We will fax the order to [facility] in [place] for an open MRI. If you don't hear from them in a couple days, please call directly to set up the appointment.     An order for physical therapy was added today. Physical therapy schedulers should call you in the next few days to schedule an appointment. You may also call [phone] to arrange an appointment at any of the [facility] outpatient physical therapy locations.  It will be very important for you to do the physical therapy exercises on a regular basis to decrease your pain and prevent future flares of pain.     Once you have the MRI done please send me a message on [employer] and set up an appointment to go over the imaging.     Thank you,  Dione Rolon NP  Physical Medicine and Rehabilitation, Medical Spine  PM&R clinic Phone: [phone]  PM&R clinic Fax: [phone]

## 2023-09-01 ENCOUNTER — MYC REFILL (OUTPATIENT)
Dept: FAMILY MEDICINE | Facility: CLINIC | Age: 48
End: 2023-09-01
Payer: COMMERCIAL

## 2023-09-01 DIAGNOSIS — F90.9 ATTENTION DEFICIT HYPERACTIVITY DISORDER (ADHD), UNSPECIFIED ADHD TYPE: ICD-10-CM

## 2023-09-01 RX ORDER — DEXTROAMPHETAMINE SACCHARATE, AMPHETAMINE ASPARTATE MONOHYDRATE, DEXTROAMPHETAMINE SULFATE AND AMPHETAMINE SULFATE 7.5; 7.5; 7.5; 7.5 MG/1; MG/1; MG/1; MG/1
30 CAPSULE, EXTENDED RELEASE ORAL DAILY
Qty: 30 CAPSULE | Refills: 0 | Status: SHIPPED | OUTPATIENT
Start: 2023-09-01 | End: 2023-10-25

## 2023-09-01 NOTE — TELEPHONE ENCOUNTER
Routing refill request to provider for review/approval because:  Drug not on the [facility] refill protocol- controlled substance    Please fill in with dates, using N/A if not applicable:   Urine Drug Screen in the last 12 months - 12/29/22   Controlled Substance Agreement in the last 12 months (Must be scanned to the ControlledSubstance Agreement-Opioid document type) See Controlled Substance Agreement Opioid Procedure for workflow- NO   PHQ-9 completed in the last 12 months - NO   REBEKAH-7 completed in the last 12 months- NO    Pending Prescriptions:                       Disp   Refills    amphetamine-dextroamphetamine (ADDERALL X*30 cap*0            Sig: Take 1 capsule (30 mg) by mouth daily    Last office visitwith prescribing provider: 8/8/2023   Future Office Visit:   Appointments in Next Year      Sep 05, 2023 10:00 AM  (Arrive by 9:45 AM)  Acute Spine Care Evaluation with DYLAN [facility] ([facility] ) [phone]     Sep 29, 2023  1:50 PM  Acute Spine Care Treatment with DYLAN [facility] ([facility] ) [phone]     Oct 06, 2023  1:10 PM  Acute Spine Care Treatment with DYLAN [facility] ([facility] ) [phone]

## 2023-09-11 ENCOUNTER — TELEPHONE (OUTPATIENT)
Dept: FAMILY MEDICINE | Facility: CLINIC | Age: 48
End: 2023-09-11
Payer: COMMERCIAL

## 2023-09-11 NOTE — TELEPHONE ENCOUNTER
Please call and help patient schedule a procedural visit 40 min for lipoma removal    Jame Colunga MD

## 2023-09-18 ENCOUNTER — TRANSFERRED RECORDS (OUTPATIENT)
Dept: HEALTH INFORMATION MANAGEMENT | Facility: CLINIC | Age: 48
End: 2023-09-18
Payer: COMMERCIAL

## 2023-09-18 DIAGNOSIS — M54.41 CHRONIC BILATERAL LOW BACK PAIN WITH BILATERAL SCIATICA: ICD-10-CM

## 2023-09-18 DIAGNOSIS — G89.29 CHRONIC BILATERAL LOW BACK PAIN WITH BILATERAL SCIATICA: ICD-10-CM

## 2023-09-18 DIAGNOSIS — M79.18 CHRONIC BUTTOCK PAIN: ICD-10-CM

## 2023-09-18 DIAGNOSIS — G89.29 CHRONIC BUTTOCK PAIN: ICD-10-CM

## 2023-09-18 DIAGNOSIS — M25.50 CHRONIC PAIN OF MULTIPLE JOINTS: ICD-10-CM

## 2023-09-18 DIAGNOSIS — M54.42 CHRONIC BILATERAL LOW BACK PAIN WITH BILATERAL SCIATICA: ICD-10-CM

## 2023-09-18 DIAGNOSIS — M17.11 PRIMARY OSTEOARTHRITIS OF RIGHT KNEE: ICD-10-CM

## 2023-09-18 DIAGNOSIS — G89.29 CHRONIC PAIN OF MULTIPLE JOINTS: ICD-10-CM

## 2023-09-19 ENCOUNTER — TRANSFERRED RECORDS (OUTPATIENT)
Dept: HEALTH INFORMATION MANAGEMENT | Facility: CLINIC | Age: 48
End: 2023-09-19
Payer: COMMERCIAL

## 2023-09-19 RX ORDER — MELOXICAM 7.5 MG/1
TABLET ORAL
Qty: 90 TABLET | Refills: 3 | Status: SHIPPED | OUTPATIENT
Start: 2023-09-19 | End: 2024-04-10

## 2023-09-19 NOTE — TELEPHONE ENCOUNTER
Routing refill request to provider for review/approval because:  Labs not current:    Lab Results   Component Value Date    ALT 34 07/25/2022     AST   Date Value Ref Range Status   07/25/2022 31 10 - 50 U/L Final     Lab Results   Component Value Date    WBC 8.1 07/25/2022     Lab Results   Component Value Date    RBC 5.06 07/25/2022     Lab Results   Component Value Date    HGB 16.2 07/25/2022     Lab Results   Component Value Date    HCT 47.3 07/25/2022     No components found for: MCT  Lab Results   Component Value Date    MCV 94 07/25/2022     Lab Results   Component Value Date    MCH 32.0 07/25/2022     Lab Results   Component Value Date    MCHC 34.2 07/25/2022     Lab Results   Component Value Date    RDW 12.8 07/25/2022     Lab Results   Component Value Date     07/25/2022     Creatinine   Date Value Ref Range Status   07/25/2022 1.07 0.67 - 1.17 mg/dL Final     Libia Musa RN   [facility] Refill team
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
## 2023-10-06 ENCOUNTER — THERAPY VISIT (OUTPATIENT)
Dept: PHYSICAL THERAPY | Facility: CLINIC | Age: 48
End: 2023-10-06
Attending: NURSE PRACTITIONER
Payer: COMMERCIAL

## 2023-10-06 DIAGNOSIS — M54.2 NECK PAIN: Primary | ICD-10-CM

## 2023-10-06 PROCEDURE — 97110 THERAPEUTIC EXERCISES: CPT | Mod: GP | Performed by: PHYSICAL THERAPIST

## 2023-10-06 PROCEDURE — 97530 THERAPEUTIC ACTIVITIES: CPT | Mod: GP | Performed by: PHYSICAL THERAPIST

## 2023-10-06 PROCEDURE — 97161 PT EVAL LOW COMPLEX 20 MIN: CPT | Mod: GP | Performed by: PHYSICAL THERAPIST
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
## 2023-10-11 ENCOUNTER — VIRTUAL VISIT (OUTPATIENT)
Dept: PHYSICAL MEDICINE AND REHAB | Facility: CLINIC | Age: 48
End: 2023-10-11
Payer: COMMERCIAL

## 2023-10-11 DIAGNOSIS — M48.02 NEUROFORAMINAL STENOSIS OF CERVICAL SPINE: ICD-10-CM

## 2023-10-11 DIAGNOSIS — R29.898 WEAKNESS OF LEFT UPPER EXTREMITY: Primary | ICD-10-CM

## 2023-10-11 DIAGNOSIS — M54.12 CERVICAL RADICULOPATHY: ICD-10-CM

## 2023-10-11 DIAGNOSIS — R20.0 NUMBNESS: ICD-10-CM

## 2023-10-11 PROCEDURE — 99215 OFFICE O/P EST HI 40 MIN: CPT | Mod: VID | Performed by: NURSE PRACTITIONER
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
## 2023-10-11 NOTE — NURSING NOTE
Is the patient currently in the state of [place]? YES    Visit mode:VIDEO    If the visit is dropped, the patient can be reconnected by: TELEPHONE VISIT: Phone number:   Telephone Information:   Mobile [phone]       Will anyone else be joining the visit? NO  (If patient encounters technical issues they should call [phone])    How would you like to obtain your AVS? MyChart    Are changes needed to the allergy or medication list? Pt stated no med changes    Reason for visit: Video Visit (MRI review )    Elise RICHEY

## 2023-10-11 NOTE — PROGRESS NOTES
"Virtual Visit Details    Type of service:  Video Visit     Originating Location (pt. Location): {video visit patient location:227440::\"Home\"}  {PROVIDER LOCATION On-site should be selected for visits conducted from your clinic location or adjoining [facility] hospital, academic office, or other nearby [facility] building. Off-site should be selected for all other provider locations, including home:837422}  Distant Location (provider location):  {virtual location provider:587720}  Platform used for Video Visit: {Virtual Visit Platforms:930076::\"[employer]\"}    "

## 2023-10-11 NOTE — PATIENT INSTRUCTIONS
It was a pleasure seeing you in the clinic today.  As discussed, we made the following updates to your plan of care:    I added an order for an epidural steroid injection with Dr. Garber at the [facility] location as requested.  You will receive a call from the schedulers to set up the appointment.  I included more information about the injection below.      An order for EMG was added today. The clinic will call you to schedule. You may also call the Neurology clinic at [phone] if you do not hear from them in the next couple of days.     Let's plan to follow up 2 weeks after the steroid injection to check in on the low back and leg symptoms you described. Please call the clinic number below to set up that appointment.     Please let me know if you have any other questions or concerns!       Thank you,  Dione Rolon NP  Physical Medicine and Rehabilitation, Medical Spine  PM[facility]R clinic Phone: [phone]  [facility] clinic Fax: [phone]            Preparing for Spine Injection Therapy              Why Do I Need Spine Injection Therapy?  Your Health Care Provider is recommending spine injection therapy to  help relieve your back and neck pain. This will be in addition to other therapies such as medications and physical therapy. The purpose of these injections is to reduce the amount of inflammation (swelling, pain, heat, redness, loss of body function) around the nerves thus reducing the amount of pain.     The medications you will receive with the injections will include:   Anesthetic - medication to numb the painful area.    Steroid - medication that prevents or reduces swelling and pain (anti-inflammatory).   To reduce your discomfort during the injection procedure, you will receive a numbing medication injection prior to the placement of the needles. You will be lying on your stomach during the injection procedure. We will use a low-dose x-ray (fluoroscopy) to help guide needle placement.  You must have a   for this procedure. We will need to reschedule your injection if you do not have a  with you.      What are the different types of injections and procedure?  Below, is a brief description of the different types of injections we use to deliver pain medication as close as possible to the nerves in the painful area:    Epidural injection: (picture on the right) Epidural injections place 2 medications in your epidural space.  This is the space alongside your spinal canal (not inside of it). Nerves from your spinal cord pass through this space. The medications will bathe those nerves.       Facet joint injection: These injections place 2 medications into the joints of your neck or spine.   SI joint injection: (picture on the left) deliver pain medications into the Sacroiliac joint that connects the hip bones.   Hip joint injection: deliver pain medication to the joint that connect your hip and femur bones (the femur is the bone in the center of the leg that extends from the knee with the hip).  You will be lying on your side with your affected side up. For example, if we are injecting your left hip, then you will be lying on your right side.   Medial Branch Block injections: This is a test to see if your pain is coming from a specific nerve. This injection is similar to a facet joint injection but contains only the numbing medication.  You will keep a pain score diary for the rest of the day and the following morning after receiving the injection.    Radiofrequency ablation: This is a procedure that uses radio waves and numbing medication to block the nerves that feel pain at the joint. The pain relief effects can last for a long time, but are not permanent. The procedure is similar to the Medial Branch Block but requires additional testing to ensure that the needle is near the nerve before numbing and ablating it.  Doctors often order sedation for this procedure.  Please see the sections on sedation  below.      What Should I Expect if I Receive Sedation? THIS IS ORDERED BY THE PHYSICIAN  This is conscious sedation.  The medications we give to you will help you relax and reduce your anxiety.  You will still be awake for the procedure so we can ask you questions and hear your answers.     What Are My Responsibilities With Sedation?  You must stop eating and drinking 8 (eight) hours before your procedure. You can have clear fluids (water, coffee/tea without milk) up to 2 hours prior to the injections. Nothing by mouth for 2 hours prior to injection.  This includes gum, mints, or chew.  Take your morning medications with a small sip of water.    You must have a  who will check-in with you, and stay in the building while the procedure is underway.  If you do not have a  your sedation will be cancelled.  We will monitor you for at least 30 minutes after the procedure before being discharged home.      What Are the Risks and Complications For This Procedure?  Risks and complication are rare, but can still occur.  You should understand, discuss, and accept these risks before agreeing to the procedure. They include, but are not limited to:  infection  nerve damage   paralysis  injection failure or a need for further injections or additional procedures  continued or worsening of symptoms/pain,   medication reaction,  dural leak (into the hole covering around the spinal cord. This may cause a a spinal headache)  leak of the medication into the spinal canal, nerves, or blood vessel.  death       What do I need to do before the procedure?   If you do not follow these instructions your procedure may be cancelled.  Tell us if you are on major blood thinners such as Coumadin, Xarelto , Plavix, Eliquis , Pradaxa , or others.    Contact the doctor who prescribed your blood thinner to ask for permission to stop taking it before you have the injection.    Schedule your pain injection procedure after your doctor gave  their permission.   We will notify you when to stop and re-start your blood thinner.  Tell us if you have any allergies to contrast dye.  If you do, we may give additional medications to take before the procedure.  Tell us if you are pregnant, or possibly pregnant.  If so, you cannot receive steroid medications or be exposed to fluoroscopic X-rays.  Tell us if you have been sick during the 10 days before the procedure. This includes:   colds   gastrointestinal illness or discomfort  dental sores,   skin infection, or any other type of infection.  Tell us if you have taken antibiotics during the 10 days prior to the procedure.  Do not drink alcohol the night before or on the day of the procedure.  You must shower the night before and on the day of your procedure.  Wear comfortable, clean clothing.  If you have an outside MRI (Magnetic Resonance Imaging photo), please bring it with you.    What Will Happen After the Procedure?  If you did not receive sedation we will monitor you for 15 minutes after the procedure. If you received sedation, we will monitor you for at least 30 minutes after the procedure     How soon can I expect pain relief?  You have received 2 types of medications with your injection:    Anesthetic - numbing medication which only acts for a few hours    Steroid which may take 3-14 days to be effective.   You can expect to feel your normal pain after the anesthetic wears off, until the steroid becomes effective.    How should I care for myself at home?  Get plenty of rest and avoid twisting, bending movements, heavy lifting, or strenuous activity for the first 24 hours. This will help the steroid be more effective. Medial Branch Block injections will have different discharge instructions.  Those will be discussed at that injection appointment.  Resume your pain medications  Apply ice packs (on for 20 minutes at a time), every 2-3 hours to your injection area for the first 2-3 days to help with pain  control.    Avoid heat (pads or water bottles), which can cause the veins to open up, making the steroid less effective. You can use heat after 48 hours.  Take showers only for the first 48 hours.  No baths, hot tubs, swimming, or soaking for 48 hours to reduce the risk of infection.     When should I call the doctor?  Call us if you have any of the following:   Fever more than 100.5 degrees Fahrenheit   Signs of infection   Severe headache   Severe back pain   Increased numbness or weakness in your legs or arms   Loss of bladder or bowel control  Nausea   Other concerns    What is the contact information?  During business hours Monday-Friday 8a-5p call [phone].   After business hours, on weekends and holidays call [phone] and ask for the PM&R doctor on call

## 2023-10-25 ENCOUNTER — MYC REFILL (OUTPATIENT)
Dept: FAMILY MEDICINE | Facility: CLINIC | Age: 48
End: 2023-10-25
Payer: COMMERCIAL

## 2023-10-25 DIAGNOSIS — F90.9 ATTENTION DEFICIT HYPERACTIVITY DISORDER (ADHD), UNSPECIFIED ADHD TYPE: ICD-10-CM

## 2023-10-25 RX ORDER — DEXTROAMPHETAMINE SACCHARATE, AMPHETAMINE ASPARTATE MONOHYDRATE, DEXTROAMPHETAMINE SULFATE AND AMPHETAMINE SULFATE 7.5; 7.5; 7.5; 7.5 MG/1; MG/1; MG/1; MG/1
30 CAPSULE, EXTENDED RELEASE ORAL DAILY
Qty: 30 CAPSULE | Refills: 0 | Status: SHIPPED | OUTPATIENT
Start: 2023-10-25 | End: 2023-12-09

## 2023-10-25 NOTE — TELEPHONE ENCOUNTER
Medication last filled:     Last clinic visit: 8/8/2023    Clinic visit frequency required: Q 6  months    Next clinic visit: N/A    Controlled substance agreement on file: No.    Urine Drug Screen on file:  Yes     Pending Prescriptions:                       Disp   Refills    amphetamine-dextroamphetamine (ADDERALL X*30 cap*0            Sig: Take 1 capsule (30 mg) by mouth daily

## 2023-10-25 NOTE — TELEPHONE ENCOUNTER
Refilled. Patient needs virtual visit for ADHD follow up next month for further refills. Please call patient and help him schedule this    Jame Colunga MD

## 2023-11-06 ENCOUNTER — MYC MEDICAL ADVICE (OUTPATIENT)
Dept: FAMILY MEDICINE | Facility: CLINIC | Age: 48
End: 2023-11-06
Payer: COMMERCIAL

## 2023-11-14 ENCOUNTER — HOSPITAL ENCOUNTER (OUTPATIENT)
Dept: CARDIOLOGY | Facility: CLINIC | Age: 48
Discharge: HOME | End: 2023-11-14
Attending: STUDENT IN AN ORGANIZED HEALTH CARE EDUCATION/TRAINING PROGRAM | Admitting: STUDENT IN AN ORGANIZED HEALTH CARE EDUCATION/TRAINING PROGRAM
Payer: COMMERCIAL

## 2023-11-14 DIAGNOSIS — R00.2 PALPITATIONS: ICD-10-CM

## 2023-11-14 PROCEDURE — 93270 REMOTE 30 DAY ECG REV/REPORT: CPT

## 2023-11-22 PROCEDURE — 93272 ECG/REVIEW INTERPRET ONLY: CPT | Performed by: INTERNAL MEDICINE

## 2023-11-22 NOTE — RESULT ENCOUNTER NOTE
Please call patient and inform him his heart monitor was normal. He triggered his monitor 3 times for symptoms and all of these were normal sinus rhythm.     Jame Colunga MD

## 2023-12-09 ENCOUNTER — MYC REFILL (OUTPATIENT)
Dept: FAMILY MEDICINE | Facility: CLINIC | Age: 48
End: 2023-12-09
Payer: COMMERCIAL

## 2023-12-09 DIAGNOSIS — F90.9 ATTENTION DEFICIT HYPERACTIVITY DISORDER (ADHD), UNSPECIFIED ADHD TYPE: ICD-10-CM

## 2023-12-11 RX ORDER — DEXTROAMPHETAMINE SACCHARATE, AMPHETAMINE ASPARTATE MONOHYDRATE, DEXTROAMPHETAMINE SULFATE AND AMPHETAMINE SULFATE 7.5; 7.5; 7.5; 7.5 MG/1; MG/1; MG/1; MG/1
30 CAPSULE, EXTENDED RELEASE ORAL DAILY
Qty: 30 CAPSULE | Refills: 0 | Status: SHIPPED | OUTPATIENT
Start: 2023-12-11 | End: 2024-01-05

## 2023-12-11 NOTE — TELEPHONE ENCOUNTER
Medication last filled:     Last clinic visit: 8/8/2023    Clinic visit frequency required: Q 6  months    Next clinic visit: 12/18/2023    Controlled substance agreement on file: No.    Urine Drug Screen on file:  Yes    Pending Prescriptions:                       Disp   Refills    amphetamine-dextroamphetamine (ADDERALL X*30 cap*0            Sig: Take 1 capsule (30 mg) by mouth daily

## 2023-12-18 ENCOUNTER — OFFICE VISIT (OUTPATIENT)
Dept: FAMILY MEDICINE | Facility: CLINIC | Age: 48
End: 2023-12-18
Payer: COMMERCIAL

## 2023-12-18 VITALS
WEIGHT: 286 LBS | SYSTOLIC BLOOD PRESSURE: 130 MMHG | OXYGEN SATURATION: 98 % | HEART RATE: 89 BPM | BODY MASS INDEX: 34.83 KG/M2 | TEMPERATURE: 97 F | HEIGHT: 76 IN | DIASTOLIC BLOOD PRESSURE: 88 MMHG

## 2023-12-18 DIAGNOSIS — E66.09 CLASS 1 OBESITY DUE TO EXCESS CALORIES WITH SERIOUS COMORBIDITY AND BODY MASS INDEX (BMI) OF 34.0 TO 34.9 IN ADULT: ICD-10-CM

## 2023-12-18 DIAGNOSIS — E66.811 CLASS 1 OBESITY DUE TO EXCESS CALORIES WITH SERIOUS COMORBIDITY AND BODY MASS INDEX (BMI) OF 34.0 TO 34.9 IN ADULT: ICD-10-CM

## 2023-12-18 DIAGNOSIS — R22.32 SUBCUTANEOUS NODULE OF LEFT UPPER EXTREMITY: Primary | ICD-10-CM

## 2023-12-18 DIAGNOSIS — R22.2 SUBCUTANEOUS NODULE OF BACK: ICD-10-CM

## 2023-12-18 PROCEDURE — 99213 OFFICE O/P EST LOW 20 MIN: CPT | Performed by: STUDENT IN AN ORGANIZED HEALTH CARE EDUCATION/TRAINING PROGRAM

## 2023-12-18 ASSESSMENT — PAIN SCALES - GENERAL: PAINLEVEL: NO PAIN (0)

## 2023-12-18 NOTE — PROGRESS NOTES
Assessment and Plan   48-year-old male who presents for 2 concerns as below    1. Subcutaneous nodule of left upper extremity  2. Subcutaneous nodule of back  Two likely lipomas over patient's left deltoid and right lower back that he would like removed.  Bothersome to him.  He talks about a pulsatile nature of his left deltoid subcutaneous nodule and therefore I would like to confirm that these are indeed lipomas.  Placed an ultrasound order for this.  - US Head Neck Soft Tissue; Future    3. Class 1 obesity due to excess calories with serious comorbidity and body mass index (BMI) of 34.0 to 34.9 in adult  Consider weight loss medication in the spring    Follow up: ASAP for 40 mint lipoma removals     Options for treatment and follow-up care were reviewed with the patient and/or guardian. Cecil Suazogiokareem and/or guardian engaged in the decision making process and verbalized understanding of the options discussed and agreed with the final plan.    Dr. Jame Colunga         HPI:   Cecil Suazo[place]rew[place] is a 48 year old  male who presents for:    Chief Complaint   Patient presents with    liomas    Weight Loss     Patient presents to discuss 2 items today.  He has some subcutaneous nodules that he would like to be removed as bothersome.  Went over his left deltoid and another on his right lower back.    He also wished to discuss weight loss.  He was considering Wegovy at 1 time but he looked this up and this made him concerned about the potential side effects.  He notes he was on a weight loss program through the [facility] at 1 time on phentermine.  This worked well for him at that time.  He has a knee surgery coming up for osteoarthritis of his knee and therefore would like to wait on weight loss medications at this time.  We will reconsider in the spring.         PMHX:     Patient Active Problem List   Diagnosis    Obesity    Hypothyroidism    Secondary hyperparathyroidism (H24)    REBEKAH (generalized anxiety  "disorder)    Attention deficit hyperactivity disorder (ADHD), unspecified ADHD type    Generalized hyperhidrosis    Male pattern baldness    Erectile dysfunction, unspecified erectile dysfunction type    Neck pain       Social History     Tobacco Use    Smoking status: Never     Passive exposure: Never    Smokeless tobacco: Never   Substance Use Topics    Alcohol use: Yes     Alcohol/week: 2.0 standard drinks of alcohol    Drug use: Not Currently     Types: Marijuana       Social History     Social History Narrative    Not on file       No Known Allergies    No results found for this or any previous visit (from the past 24 hour(s)).         Review of Systems:    ROS: 10 point ROS neg other than the symptoms noted above in the HPI.         Physical Exam:     Vitals:    12/18/23 0722   BP: 130/88   Pulse: 89   Temp: 97  F (36.1  C)   SpO2: 98%   Weight: 129.7 kg (286 lb)   Height: 1.93 m (6' 4\")     Body mass index is 34.81 kg/m .    General appearance: Alert, cooperative, no distress, appears stated age  Head: Normocephalic, atraumatic, without obvious abnormality  Eyes: Pupils equal round, reactive.  Conjunctiva clear.  Nose: Nares normal, no drainage.  Throat: Lips, mucosa, tongue normal mucosa pink and moist  Neck: Supple, symmetric, trachea midline,  Skin: nontender subcutaneous nodule over left deltoid and right lower back            "

## 2024-01-02 ENCOUNTER — HOSPITAL ENCOUNTER (OUTPATIENT)
Dept: ULTRASOUND IMAGING | Facility: CLINIC | Age: 49
Discharge: HOME | End: 2024-01-02
Attending: STUDENT IN AN ORGANIZED HEALTH CARE EDUCATION/TRAINING PROGRAM | Admitting: STUDENT IN AN ORGANIZED HEALTH CARE EDUCATION/TRAINING PROGRAM
Payer: COMMERCIAL

## 2024-01-02 DIAGNOSIS — R22.2 SUBCUTANEOUS NODULE OF BACK: ICD-10-CM

## 2024-01-02 DIAGNOSIS — R22.32 SUBCUTANEOUS NODULE OF LEFT UPPER EXTREMITY: ICD-10-CM

## 2024-01-02 PROCEDURE — 76536 US EXAM OF HEAD AND NECK: CPT

## 2024-01-03 NOTE — RESULT ENCOUNTER NOTE
Cecil,  Your results from your recent clinic visit show:  Your Ultrasound shows these nodules are indeed lipomas. Please call in and schedule a procedure visit to get these removed if you would like to have me do this    If you have more questions please call the clinic at [phone] or send me a [employer] message    Dr. Jame Colunga

## 2024-01-04 DIAGNOSIS — E03.9 ACQUIRED HYPOTHYROIDISM: ICD-10-CM

## 2024-01-04 DIAGNOSIS — N52.9 ERECTILE DYSFUNCTION, UNSPECIFIED ERECTILE DYSFUNCTION TYPE: ICD-10-CM

## 2024-01-04 DIAGNOSIS — R61 GENERALIZED HYPERHIDROSIS: ICD-10-CM

## 2024-01-04 RX ORDER — GLYCOPYRROLATE 1 MG/1
1 TABLET ORAL DAILY
Qty: 30 TABLET | Refills: 0 | Status: SHIPPED | OUTPATIENT
Start: 2024-01-04 | End: 2024-01-22

## 2024-01-05 ENCOUNTER — MYC REFILL (OUTPATIENT)
Dept: FAMILY MEDICINE | Facility: CLINIC | Age: 49
End: 2024-01-05
Payer: COMMERCIAL

## 2024-01-05 DIAGNOSIS — N52.9 ERECTILE DYSFUNCTION, UNSPECIFIED ERECTILE DYSFUNCTION TYPE: ICD-10-CM

## 2024-01-05 DIAGNOSIS — F90.9 ATTENTION DEFICIT HYPERACTIVITY DISORDER (ADHD), UNSPECIFIED ADHD TYPE: ICD-10-CM

## 2024-01-05 RX ORDER — SILDENAFIL 50 MG/1
50-100 TABLET, FILM COATED ORAL DAILY PRN
Qty: 180 TABLET | Refills: 3 | Status: SHIPPED | OUTPATIENT
Start: 2024-01-05 | End: 2024-07-03

## 2024-01-05 RX ORDER — LEVOTHYROXINE SODIUM 150 UG/1
150 TABLET ORAL DAILY
Qty: 90 TABLET | Refills: 0 | Status: SHIPPED | OUTPATIENT
Start: 2024-01-05 | End: 2024-04-10

## 2024-01-05 RX ORDER — SILDENAFIL 50 MG/1
TABLET, FILM COATED ORAL
Qty: 180 TABLET | Refills: 0 | OUTPATIENT
Start: 2024-01-05

## 2024-01-05 RX ORDER — DEXTROAMPHETAMINE SACCHARATE, AMPHETAMINE ASPARTATE MONOHYDRATE, DEXTROAMPHETAMINE SULFATE AND AMPHETAMINE SULFATE 7.5; 7.5; 7.5; 7.5 MG/1; MG/1; MG/1; MG/1
30 CAPSULE, EXTENDED RELEASE ORAL DAILY
Qty: 30 CAPSULE | Refills: 0 | Status: SHIPPED | OUTPATIENT
Start: 2024-01-05 | End: 2024-04-10

## 2024-01-05 NOTE — TELEPHONE ENCOUNTER
Patient is due for a physical. Refilled Levothyroxine for 90 days. Please call and inform them and help make physical in this time for further refills.    Jame Colunga MD

## 2024-01-05 NOTE — TELEPHONE ENCOUNTER
Needs an ADHD follow up. Can combine with physical. Please help him schedule this    Jame Colunga MD

## 2024-01-05 NOTE — TELEPHONE ENCOUNTER
Medication last filled:     Last clinic visit: 12/18/2023    Clinic visit frequency required: Q 6  months    Next clinic visit: N/A    Controlled substance agreement on file: No.    Urine Drug Screen on file:  Yes- 12/29/2022     Pending Prescriptions:                       Disp   Refills    amphetamine-dextroamphetamine (ADDERALL X*30 cap*0            Sig: Take 1 capsule (30 mg) by mouth daily

## 2024-01-21 DIAGNOSIS — R61 GENERALIZED HYPERHIDROSIS: ICD-10-CM

## 2024-01-22 RX ORDER — GLYCOPYRROLATE 1 MG/1
3 TABLET ORAL DAILY
Qty: 270 TABLET | Refills: 3 | Status: SHIPPED | OUTPATIENT
Start: 2024-01-22

## 2024-02-06 ENCOUNTER — TRANSFERRED RECORDS (OUTPATIENT)
Dept: HEALTH INFORMATION MANAGEMENT | Facility: CLINIC | Age: 49
End: 2024-02-06
Payer: COMMERCIAL

## 2024-02-27 ENCOUNTER — TELEPHONE (OUTPATIENT)
Dept: FAMILY MEDICINE | Facility: CLINIC | Age: 49
End: 2024-02-27
Payer: COMMERCIAL

## 2024-02-27 NOTE — TELEPHONE ENCOUNTER
Contacted patient for friendly reminder regarding appointment with Dr Villalobos. If pt returns call please verify they plan to come to this apt. If pt no longer wants apt please remove from schedule. If pt does plan to come to this apt please remained them of check in time.

## 2024-04-10 ENCOUNTER — TELEPHONE (OUTPATIENT)
Dept: FAMILY MEDICINE | Facility: CLINIC | Age: 49
End: 2024-04-10

## 2024-04-10 ENCOUNTER — OFFICE VISIT (OUTPATIENT)
Dept: FAMILY MEDICINE | Facility: CLINIC | Age: 49
End: 2024-04-10
Payer: COMMERCIAL

## 2024-04-10 VITALS
SYSTOLIC BLOOD PRESSURE: 135 MMHG | BODY MASS INDEX: 34.22 KG/M2 | TEMPERATURE: 97.7 F | HEART RATE: 80 BPM | OXYGEN SATURATION: 97 % | RESPIRATION RATE: 15 BRPM | DIASTOLIC BLOOD PRESSURE: 88 MMHG | WEIGHT: 281 LBS | HEIGHT: 76 IN

## 2024-04-10 DIAGNOSIS — N25.81 SECONDARY HYPERPARATHYROIDISM (H): ICD-10-CM

## 2024-04-10 DIAGNOSIS — E03.9 ACQUIRED HYPOTHYROIDISM: ICD-10-CM

## 2024-04-10 DIAGNOSIS — E04.1 THYROID NODULE: ICD-10-CM

## 2024-04-10 DIAGNOSIS — F90.9 ATTENTION DEFICIT HYPERACTIVITY DISORDER (ADHD), UNSPECIFIED ADHD TYPE: ICD-10-CM

## 2024-04-10 DIAGNOSIS — Z00.00 HEALTHCARE MAINTENANCE: ICD-10-CM

## 2024-04-10 DIAGNOSIS — M54.12 CERVICAL RADICULOPATHY: ICD-10-CM

## 2024-04-10 DIAGNOSIS — M17.0 PRIMARY OSTEOARTHRITIS OF BOTH KNEES: ICD-10-CM

## 2024-04-10 DIAGNOSIS — E66.09 CLASS 1 OBESITY DUE TO EXCESS CALORIES WITH SERIOUS COMORBIDITY AND BODY MASS INDEX (BMI) OF 34.0 TO 34.9 IN ADULT: ICD-10-CM

## 2024-04-10 DIAGNOSIS — Z00.00 ANNUAL PHYSICAL EXAM: Primary | ICD-10-CM

## 2024-04-10 DIAGNOSIS — F41.1 GAD (GENERALIZED ANXIETY DISORDER): ICD-10-CM

## 2024-04-10 DIAGNOSIS — E66.811 CLASS 1 OBESITY DUE TO EXCESS CALORIES WITH SERIOUS COMORBIDITY AND BODY MASS INDEX (BMI) OF 34.0 TO 34.9 IN ADULT: ICD-10-CM

## 2024-04-10 PROBLEM — R61 GENERALIZED HYPERHIDROSIS: Status: RESOLVED | Noted: 2022-12-29 | Resolved: 2024-04-10

## 2024-04-10 LAB
ALBUMIN SERPL BCG-MCNC: 4.7 G/DL (ref 3.5–5.2)
ALP SERPL-CCNC: 114 U/L (ref 40–150)
ALT SERPL W P-5'-P-CCNC: 31 U/L (ref 0–70)
AMPHETAMINES UR QL SCN: ABNORMAL
ANION GAP SERPL CALCULATED.3IONS-SCNC: 12 MMOL/L (ref 7–15)
AST SERPL W P-5'-P-CCNC: 30 U/L (ref 0–45)
BARBITURATES UR QL SCN: ABNORMAL
BENZODIAZ UR QL SCN: ABNORMAL
BILIRUB SERPL-MCNC: 0.5 MG/DL
BUN SERPL-MCNC: 16.1 MG/DL (ref 6–20)
BZE UR QL SCN: ABNORMAL
CALCIUM SERPL-MCNC: 9.6 MG/DL (ref 8.6–10)
CANNABINOIDS UR QL SCN: ABNORMAL
CHLORIDE SERPL-SCNC: 103 MMOL/L (ref 98–107)
CHOLEST SERPL-MCNC: 178 MG/DL
CREAT SERPL-MCNC: 1.19 MG/DL (ref 0.67–1.17)
DEPRECATED HCO3 PLAS-SCNC: 25 MMOL/L (ref 22–29)
EGFRCR SERPLBLD CKD-EPI 2021: 75 ML/MIN/1.73M2
ERYTHROCYTE [DISTWIDTH] IN BLOOD BY AUTOMATED COUNT: 12.8 % (ref 10–15)
FASTING STATUS PATIENT QL REPORTED: ABNORMAL
FENTANYL UR QL: ABNORMAL
GLUCOSE SERPL-MCNC: 91 MG/DL (ref 70–99)
HCT VFR BLD AUTO: 46.2 % (ref 40–53)
HDLC SERPL-MCNC: 45 MG/DL
HGB BLD-MCNC: 16 G/DL (ref 13.3–17.7)
LDLC SERPL CALC-MCNC: 107 MG/DL
MCH RBC QN AUTO: 31.8 PG (ref 26.5–33)
MCHC RBC AUTO-ENTMCNC: 34.6 G/DL (ref 31.5–36.5)
MCV RBC AUTO: 92 FL (ref 78–100)
NONHDLC SERPL-MCNC: 133 MG/DL
OPIATES UR QL SCN: ABNORMAL
PCP QUAL URINE (ROCHE): ABNORMAL
PLATELET # BLD AUTO: 274 10E3/UL (ref 150–450)
POTASSIUM SERPL-SCNC: 4 MMOL/L (ref 3.4–5.3)
PROT SERPL-MCNC: 7.7 G/DL (ref 6.4–8.3)
PTH-INTACT SERPL-MCNC: 11 PG/ML (ref 15–65)
RBC # BLD AUTO: 5.03 10E6/UL (ref 4.4–5.9)
SODIUM SERPL-SCNC: 140 MMOL/L (ref 135–145)
T4 FREE SERPL-MCNC: 1.46 NG/DL (ref 0.9–1.7)
TRIGL SERPL-MCNC: 128 MG/DL
TSH SERPL DL<=0.005 MIU/L-ACNC: 4.65 UIU/ML (ref 0.3–4.2)
VIT D+METAB SERPL-MCNC: 40 NG/ML (ref 20–50)
WBC # BLD AUTO: 9.1 10E3/UL (ref 4–11)

## 2024-04-10 PROCEDURE — 83970 ASSAY OF PARATHORMONE: CPT | Performed by: STUDENT IN AN ORGANIZED HEALTH CARE EDUCATION/TRAINING PROGRAM

## 2024-04-10 PROCEDURE — 99214 OFFICE O/P EST MOD 30 MIN: CPT | Mod: 25 | Performed by: STUDENT IN AN ORGANIZED HEALTH CARE EDUCATION/TRAINING PROGRAM

## 2024-04-10 PROCEDURE — 36415 COLL VENOUS BLD VENIPUNCTURE: CPT | Performed by: STUDENT IN AN ORGANIZED HEALTH CARE EDUCATION/TRAINING PROGRAM

## 2024-04-10 PROCEDURE — 80061 LIPID PANEL: CPT | Performed by: STUDENT IN AN ORGANIZED HEALTH CARE EDUCATION/TRAINING PROGRAM

## 2024-04-10 PROCEDURE — 91320 SARSCV2 VAC 30MCG TRS-SUC IM: CPT | Performed by: STUDENT IN AN ORGANIZED HEALTH CARE EDUCATION/TRAINING PROGRAM

## 2024-04-10 PROCEDURE — 90480 ADMN SARSCOV2 VAC 1/ONLY CMP: CPT | Performed by: STUDENT IN AN ORGANIZED HEALTH CARE EDUCATION/TRAINING PROGRAM

## 2024-04-10 PROCEDURE — 84443 ASSAY THYROID STIM HORMONE: CPT | Performed by: STUDENT IN AN ORGANIZED HEALTH CARE EDUCATION/TRAINING PROGRAM

## 2024-04-10 PROCEDURE — 82306 VITAMIN D 25 HYDROXY: CPT | Performed by: STUDENT IN AN ORGANIZED HEALTH CARE EDUCATION/TRAINING PROGRAM

## 2024-04-10 PROCEDURE — 85027 COMPLETE CBC AUTOMATED: CPT | Performed by: STUDENT IN AN ORGANIZED HEALTH CARE EDUCATION/TRAINING PROGRAM

## 2024-04-10 PROCEDURE — 84439 ASSAY OF FREE THYROXINE: CPT | Performed by: STUDENT IN AN ORGANIZED HEALTH CARE EDUCATION/TRAINING PROGRAM

## 2024-04-10 PROCEDURE — 80053 COMPREHEN METABOLIC PANEL: CPT | Performed by: STUDENT IN AN ORGANIZED HEALTH CARE EDUCATION/TRAINING PROGRAM

## 2024-04-10 PROCEDURE — 99396 PREV VISIT EST AGE 40-64: CPT | Mod: 25 | Performed by: STUDENT IN AN ORGANIZED HEALTH CARE EDUCATION/TRAINING PROGRAM

## 2024-04-10 PROCEDURE — 80307 DRUG TEST PRSMV CHEM ANLYZR: CPT | Performed by: STUDENT IN AN ORGANIZED HEALTH CARE EDUCATION/TRAINING PROGRAM

## 2024-04-10 RX ORDER — DEXTROAMPHETAMINE SACCHARATE, AMPHETAMINE ASPARTATE MONOHYDRATE, DEXTROAMPHETAMINE SULFATE AND AMPHETAMINE SULFATE 7.5; 7.5; 7.5; 7.5 MG/1; MG/1; MG/1; MG/1
30 CAPSULE, EXTENDED RELEASE ORAL DAILY
Qty: 30 CAPSULE | Refills: 0 | Status: CANCELLED | OUTPATIENT
Start: 2024-04-10

## 2024-04-10 RX ORDER — CELECOXIB 200 MG/1
200 CAPSULE ORAL DAILY
Qty: 90 CAPSULE | Refills: 3 | Status: SHIPPED | OUTPATIENT
Start: 2024-04-10

## 2024-04-10 RX ORDER — DEXTROAMPHETAMINE SACCHARATE, AMPHETAMINE ASPARTATE, DEXTROAMPHETAMINE SULFATE AND AMPHETAMINE SULFATE 5; 5; 5; 5 MG/1; MG/1; MG/1; MG/1
20 TABLET ORAL 2 TIMES DAILY
Qty: 60 TABLET | Refills: 0 | Status: SHIPPED | OUTPATIENT
Start: 2024-04-10 | End: 2024-05-07

## 2024-04-10 RX ORDER — GABAPENTIN 300 MG/1
300-600 CAPSULE ORAL 3 TIMES DAILY PRN
Qty: 90 CAPSULE | Refills: 3 | Status: SHIPPED | OUTPATIENT
Start: 2024-04-10

## 2024-04-10 RX ORDER — DEXTROAMPHETAMINE SACCHARATE, AMPHETAMINE ASPARTATE MONOHYDRATE, DEXTROAMPHETAMINE SULFATE AND AMPHETAMINE SULFATE 5; 5; 5; 5 MG/1; MG/1; MG/1; MG/1
20 CAPSULE, EXTENDED RELEASE ORAL 2 TIMES DAILY
Qty: 60 CAPSULE | Refills: 0 | Status: SHIPPED | OUTPATIENT
Start: 2024-04-10 | End: 2024-05-07

## 2024-04-10 RX ORDER — LEVOTHYROXINE SODIUM 150 UG/1
150 TABLET ORAL DAILY
Qty: 90 TABLET | Refills: 3 | Status: SHIPPED | OUTPATIENT
Start: 2024-04-10

## 2024-04-10 ASSESSMENT — PAIN SCALES - GENERAL: PAINLEVEL: SEVERE PAIN (6)

## 2024-04-10 NOTE — PROGRESS NOTES
Assessment/ Plan   48-year-old male with past medical history of ADHD, generalized anxiety disorder, hypothyroidism, vitamin D associated hyperparathyroidism, obesity who presents for annual physical as well as a couple of concerns as below.    1. Annual physical exam  - Comprehensive metabolic panel (BMP + Alb, Alk Phos, ALT, AST, Total. Bili, TP); Future  - CBC with platelets; Future  - Lipid panel reflex to direct LDL Fasting; Future    2. Secondary hyperparathyroidism (H24)  Diagnosed in 2017 thought to be secondary to vitamin D deficiency and inadequate calcium intake.  Recommend to supplement.  This was checked in July 2022 and normal. Recheck today  - Vitamin D deficiency screening; Future  - Parathyroid Hormone Intact; Future    3. Attention deficit hyperactivity disorder (ADHD), unspecified ADHD type  He again feels like there is further room for improvement with an increased dose. We will increase him to 40 mg daily of adderral XR also add on a short acting dose in the afternoon as he significant fall off of the effect then.     - amphetamine-dextroamphetamine (ADDERALL XR) 20 MG 24 hr capsule; Take 1 capsule (20 mg) by mouth 2 times daily  Dispense: 60 capsule; Refill: 0  - amphetamine-dextroamphetamine (ADDERALL) 20 MG tablet; Take 1 tablet (20 mg) by mouth 2 times daily  Dispense: 60 tablet; Refill: 0  - Urine Drug Screen; Future    4. Primary osteoarthritis of both knees  Seeing [facility]. Right knee replacement 1/24. Using celebrex switched from meloxicam. Goig to have left knee done in summer 2024  - celecoxib (CELEBREX) 200 MG capsule; Take 1 capsule (200 mg) by mouth daily  Dispense: 90 capsule; Refill: 3    5. REBEKAH (generalized anxiety disorder)  Depression/REBEKAH HX:  Hx of anxiety attacks which improved with wellbutrin.  This was related to a divorce he went through approximately fall 2021.  He feels his anxiety has significantly improved and thinks Wellbutrin may be contributing to some of  his erectile dysfunction.  He is going to trial stopping this.    4/24 He did indeed stop and feels like his anxiety is fine without wellbutrin.    6. Class 1 obesity due to excess calories with serious comorbidity and body mass index (BMI) of 34.0 to 34.9 in adult  Patient has a Hx of significant obesity. Was seen at bariatric clinic in 2014. Lost significant weight with this. For now would like to increase adderral and work on diet and exercise himself.    7. Cervical radiculopathy  - gabapentin (NEURONTIN) 300 MG capsule; Take 1-2 capsules (300-600 mg) by mouth 3 times daily as needed for neuropathic pain  Dispense: 90 capsule; Refill: 3    8. Healthcare maintenance    9. Acquired hypothyroidism  Unknown cause on 150 mcg synthroid  daily    TSH   Date Value Ref Range Status   07/25/2022 4.08 0.30 - 4.20 uIU/mL Final   12/24/2021 2.95 0.30 - 5.00 uIU/mL Final       Free T4   Date Value Ref Range Status   07/25/2022 1.18 0.90 - 1.70 ng/dL Final     - TSH with free T4 reflex; Future  - levothyroxine (SYNTHROID/LEVOTHROID) 150 MCG tablet; Take 1 tablet (150 mcg) by mouth daily  Dispense: 90 tablet; Refill: 3    10. Thyroid nodule  Incidentally found on MRI of cervical spine. Orthopedist recommended PCP Follow-up. Will obtain US of thyroid and go from there.    - US Thyroid; Future    Follow-up in: 6 months for ADHD Follow-up virtual    Jame Colunga MD    Subjective:     Cecil Moreira is a 48 year old male who presents for an annual exam.     Chief Complaint   Patient presents with    Medication Follow-up     Colonoscopy 8/20- repeat in 5 years  PSA:  Prostate Specific Antigen Screen   Date Value Ref Range Status   07/25/2022 0.63 0.00 - 2.50 ng/mL Final   02/13/2020 0.6 0.0 - 2.5 ng/mL Final       Immunization History   Administered Date(s) Administered    COVID-19 Bivalent 12+ (Pfizer) 11/21/2022    COVID-19 MONOVALENT 12+ (Pfizer) 11/15/2021    COVID-19 Vaccine (Vivian) 03/30/2021    DT (PEDS <7y) 01/01/2004     Flu, Unspecified 09/21/2011, 09/27/2012, 10/02/2013    HepA, Unspecified 04/12/2007, 09/10/2008    Influenza Vaccine 18-64 (Flublok) 09/17/2020    Influenza Vaccine >6 months,quad, PF 12/29/2022    Influenza Vaccine, 6+MO IM (QUADRIVALENT W/PRESERVATIVES) 10/20/2018, 02/12/2020    TD,PF 7+ (Tenivac) 02/12/2020    TDAP (Adacel,Boostrix) 10/23/2008    Td,adult,historic,unspecified 10/23/2008     Immunization status: due today.     Current Outpatient Medications   Medication Sig Dispense Refill    acetaminophen (TYLENOL) 500 MG tablet Take 1,000 mg by mouth      amphetamine-dextroamphetamine (ADDERALL XR) 20 MG 24 hr capsule Take 1 capsule (20 mg) by mouth 2 times daily 60 capsule 0    amphetamine-dextroamphetamine (ADDERALL) 20 MG tablet Take 1 tablet (20 mg) by mouth 2 times daily 60 tablet 0    aspirin (ASA) 81 MG chewable tablet Take 81 mg by mouth      celecoxib (CELEBREX) 200 MG capsule Take 1 capsule (200 mg) by mouth daily 90 capsule 3    gabapentin (NEURONTIN) 300 MG capsule Take 1-2 capsules (300-600 mg) by mouth 3 times daily as needed for neuropathic pain 90 capsule 3    glycopyrrolate (ROBINUL) 1 MG tablet Take 3 tablets by mouth once daily 270 tablet 3    levothyroxine (SYNTHROID/LEVOTHROID) 150 MCG tablet Take 1 tablet (150 mcg) by mouth daily 90 tablet 3    sildenafil (VIAGRA) 50 MG tablet Take 1-2 tablets ( mg) by mouth daily as needed (erectile dysfunction) 180 tablet 3    tadalafil (ADCIRCA/CIALIS) 20 MG tablet Take 1 tablet (20 mg) by mouth daily as needed (erectiule dysfunction) 30 tablet 3     No past medical history on file.  Past Surgical History:   Procedure Laterality Date    ZZC APPENDECTOMY      Description: Appendectomy;  Recorded: 09/09/2008;  Comments: 1990.     Patient has no known allergies.  Family History   Problem Relation Age of Onset    Coronary Artery Disease Mother     Hypertension Mother     Diabetes Type 2  Mother     Benign prostatic hyperplasia Mother     Heart  Disease Father         Afib with Valvular replacement.    Thyroid Disease Father     Thyroid Disease Paternal Aunt     Alzheimer Disease Paternal Grandmother      Social History     Socioeconomic History    Marital status:      Spouse name: Not on file    Number of children: Not on file    Years of education: Not on file    Highest education level: Not on file   Occupational History    Not on file   Tobacco Use    Smoking status: Never     Passive exposure: Never    Smokeless tobacco: Never   Vaping Use    Vaping status: Never Used   Substance and Sexual Activity    Alcohol use: Yes     Alcohol/week: 2.0 standard drinks of alcohol    Drug use: Not Currently     Types: Marijuana    Sexual activity: Yes     Partners: Female   Other Topics Concern    Not on file   Social History Narrative    Not on file     Social Determinants of Health     Financial Resource Strain: Low Risk  (12/18/2023)    Financial Resource Strain     Within the past 12 months, have you or your family members you live with been unable to get utilities (heat, electricity) when it was really needed?: No   Recent Concern: Financial Resource Strain - High Risk (12/13/2023)    Received from [facility]    Overall Financial Resource Strain (CARDIA)     Difficulty of Paying Living Expenses: Hard   Food Insecurity: Low Risk  (12/18/2023)    Food Insecurity     Within the past 12 months, did you worry that your food would run out before you got money to buy more?: No     Within the past 12 months, did the food you bought just not last and you didn t have money to get more?: No   Transportation Needs: Low Risk  (12/18/2023)    Transportation Needs     Within the past 12 months, has lack of transportation kept you from medical appointments, getting your medicines, non-medical meetings or appointments, work, or from getting things that you need?: No   Physical Activity: Insufficiently Active (12/13/2023)    Received from [facility]    Exercise Vital Sign     Days of Exercise per Week: 2 days     Minutes of Exercise per Session: 20 min   Stress: Not on file   Social Connections: Not on file   Interpersonal Safety: Low Risk  (12/18/2023)    Interpersonal Safety     Do you feel physically and emotionally safe where you currently live?: Yes     Within the past 12 months, have you been hit, slapped, kicked or otherwise physically hurt by someone?: No     Within the past 12 months, have you been humiliated or emotionally abused in other ways by your partner or ex-partner?: No   Housing Stability: Low Risk  (12/18/2023)    Housing Stability     Do you have housing? : Yes     Are you worried about losing your housing?: No       Review of Systems  Complete ROS negative except as noted in the HPI    Objective:      Vitals:    04/10/24 0828   BP: 135/88   Pulse: 80   Resp: 15   Temp: 97.7  F (36.5  C)   SpO2: 97%   Weight: 127.5 kg (281 lb)   Height: 1.93 m (6' 4\")       General appearance: Alert, cooperative, no distress, appears stated age, obese  Head: Normocephalic, atraumatic, without obvious abnormality  EARS: TM's gray dull with structures seen bilaterally  Eyes: Pupils equal round, reactive.  Conjunctiva clear.  Nose: Nares normal, no drainage.  Throat: Lips, mucosa, tongue normal mucosa pink and moist  Neck: Supple, symmetric, trachea midline, no adenopathy.  No thyroid enlargement, tenderness or nodules.    Lungs: Clear to auscultation bilaterally, no wheezing or crackles present.  Respirations unlabored  Heart: Regular rate and rhythm, normal S1 and S2, no murmur, rub or gallop.  Abdomen: Soft, nontender, nondistended.  Bowel sounds active in all 4 quadrants.  No masses or organomegaly.  Extremities: Extremities normal, atraumatic.  No cyanosis or edema.  Skin: Skin color, texture, turgor normal no rashes or lesions on limited skin exam  Neurologic: CN II through XII intact, normal strength.      Jame Villalobos MD    "

## 2024-04-10 NOTE — TELEPHONE ENCOUNTER
PA Initiation    Medication: Amphetamine-Dextroamphetamine 20 MG   Insurance Company:  [employer]  Pharmacy Filling the Rx:  [facility]  Filling Pharmacy Phone:  [phone]  Filling Pharmacy Fax:  [phone]  Start Date:  04/10/2024

## 2024-04-10 NOTE — LETTER
[facility]  04/10/24  Patient: Cecil Moreira  YOB: 1975  Medical Record Number: [id number]                                                                                  Non-Opioid Controlled Substance Agreement    This is an agreement between you and your provider regarding safe and appropriate use of controlled substances prescribed by your care team. Controlled substances are?medicines that can cause physical and mental dependence (abuse).     There are strict laws about having and using these medicines. We here at [facility] are  committed to working with you in your efforts to get better. To support you in this work, we'll help you schedule regular office appointments for medicine refills. If we must cancel or change your appointment for any reason, we'll make sure you have enough medicine to last until your next appointment.     As a Provider, I will:   Listen carefully to your concerns while treating you with respect.   Recommend a treatment plan that I believe is in your best interest and may involve therapies other than medicine.    Talk with you often about the possible benefits and the risk of harm of any medicine that we prescribe for you.  Assess the safety of this medicine and check how well it works.    Provide a plan on how to taper (discontinue or go off) using this medicine if the decision is made to stop its use.      ::  As a Patient, I understand controlled substances:     Are prescribed by my care provider to help me function or work and manage my condition(s).?  Are strong medicines and can cause serious side effects.     Need to be taken exactly as prescribed.?Combining controlled substances with certain medicines or chemicals (such as illegal drugs, alcohol, sedatives, sleeping pills, and benzodiazepines) can be dangerous or even fatal.? If I stop taking my medicines suddenly, I may have severe withdrawal symptoms.     The risks, benefits, and  side effects of these medicine(s) were explained to me. I agree that:    I will take part in other treatments as advised by my care team. This may be psychiatry or counseling, physical therapy, behavioral therapy, group treatment or a referral to specialist.    I will keep all my appointments and understand this is part of the monitoring of controlled substances.?My care team may require an office visit for EVERY controlled substance refill. If I miss appointments or don t follow instructions, my care team may stop my medicine    I will take my medicines as prescribed. I will not change the dose or schedule unless my care team tells me to. There will be no refills if I run out early.      I may be asked to come to the clinic and complete a urine drug test or complete a pill count. If I don t give a urine sample or participate in a pill count, the care team may stop my medicine.    I will only receive controlled substance prescriptions from this clinic. If I am treated by another provider, I will tell them that I am taking controlled substances and that I have a treatment agreement with this provider. I will inform my [facility] care team within one business day if I am given a prescription for any controlled substance by another healthcare provider. My [facility] care team can contact other providers and pharmacists about my use of any medicines.    It is up to me to make sure that I don't run out of my medicines on weekends or holidays.?If my care team is willing to refill my prescription without a visit, I must request refills only during office hours. Refills may take up to 3 business days to process. I will use one pharmacy to fill all my controlled substance prescriptions. I will notify the clinic about any changes to my insurance or medicine availability.    I am responsible for my prescriptions. If the medicine/prescription is lost, stolen or destroyed, it will not be replaced.?I also agree not  to share controlled substance medicines with anyone.     I am aware I should not use any illegal or recreational drugs. I agree not to drink alcohol unless my care team says I can.     If I enroll in the [place] Medical Cannabis program, I will tell my care team before my next refill.    I will tell my care team right away if I become pregnant, have a new medical problem treated outside of my regular clinic, or have a change in my medicines.     I understand that this medicine can affect my thinking, judgment and reaction time.? Alcohol and drugs affect the brain and body, which can affect the safety of my driving. Being under the influence of alcohol or drugs can affect my decision-making, behaviors, personal safety and the safety of others. Driving while impaired (DWI) can occur if a person is driving, operating or in physical control of a car, motorcycle, boat, snowmobile, ATV, motorbike, off-road vehicle or any other motor vehicle ([place] Statute 169A.20). I understand the risk if I choose to drive or operate any vehicle or machinery.    I understand that if I do not follow any of the conditions above, my prescriptions or treatment may be stopped or changed.   I agree that my provider, clinic care team and pharmacy may work with any city, state or federal law enforcement agency that investigates the misuse, sale or other diversion of my controlled medicine. I will allow my provider to discuss my care with, or share a copy of, this agreement with any other treating provider, pharmacy or emergency room where I receive care.     I have read this agreement and have asked questions about anything I did not understand.    ________________________________________________________  Patient Signature - Cecil Moreira     ___________________                   Date     ________________________________________________________  Provider Signature - Jame Villalobos MD       ___________________                   Date      ________________________________________________________  Witness Signature (required if provider not present while patient signing)          ___________________                   Date

## 2024-04-12 NOTE — RESULT ENCOUNTER NOTE
Cecil Moreira  Your results from your recent clinic visit show:  Your parathyroid was fine. I am not concerned about the mildly low result  Your CMP was normal with normal electrolytes, kidney function, and liver function. Your creatinine was mildly elevated but I am not concerned by this. We will continue to watch it  Your lipids look ok and I used these as well as other factors from your history to calculate your 10 year risk of having something like a heart attack (ASCVD risk) and it was low risk. Just continue to work on exercise and diet to maintain this low risk.    The 10-year ASCVD risk score (Francis HUANG, et al., 2019) is: 2.9%    Values used to calculate the score:      Age: 48 years      Sex: Male      Is Non- : No      Diabetic: No      Tobacco smoker: No      Systolic Blood Pressure: 135 mmHg      Is BP treated: No      HDL Cholesterol: 45 mg/dL      Total Cholesterol: 178 mg/dL    Your thyroid is good on your current dose of synthroid  Your urine drug screen was as expected  Your vitamin D was normal  Your CBC is normal with no anemia or signs of infection seen    If you have more questions please call the clinic at [phone] or send me a [employer] message    Dr. Jame Colunga

## 2024-04-24 ENCOUNTER — TELEPHONE (OUTPATIENT)
Dept: FAMILY MEDICINE | Facility: CLINIC | Age: 49
End: 2024-04-24

## 2024-04-24 NOTE — TELEPHONE ENCOUNTER
Prior Authorization Approval    Medication: AMPHETAMINE-DEXTROAMPHET ER 20 MG PO CP24  Authorization Effective Date: 4/24/2024  Authorization Expiration Date: 4/24/2025  Approved Dose/Quantity: 60/30DS  Reference #:  [id number]    Insurance Company: [employer] - Phone [phone] Fax [phone]  Which Pharmacy is filling the prescription: [facility] [address] [place] [address]  Pharmacy Notified: YES  Patient Notified: Instructed pharmacy to notify patient once order is ready.

## 2024-04-24 NOTE — TELEPHONE ENCOUNTER
PA Initiation    Medication: AMPHETAMINE-DEXTROAMPHETAMINE 20 MG PO TABS  Insurance Company: [employer] - Phone [phone] Fax [phone]  Pharmacy Filling the Rx: [facility] [address] [place] [address]  Filling Pharmacy Phone: [phone]  Filling Pharmacy Fax:    Start Date: 4/24/2024

## 2024-04-24 NOTE — TELEPHONE ENCOUNTER
PA Initiation    Medication: AMPHETAMINE-DEXTROAMPHET ER 20 MG PO CP24  Insurance Company: [employer] - Phone [phone] Fax [phone]  Pharmacy Filling the Rx: [facility] [address] [place] [address]  Filling Pharmacy Phone: [phone]  Filling Pharmacy Fax:  [phone]  Start Date: 4/24/2024    HAVE TO CALL INSURANCE FOR THIS ONE...

## 2024-04-25 NOTE — TELEPHONE ENCOUNTER
Prior Authorization Approval    Medication: AMPHETAMINE-DEXTROAMPHETAMINE 20 MG PO TABS  Authorization Effective Date: 4/24/2024  Authorization Expiration Date: 4/24/2025  Insurance Company: [employer] - Phone [phone] Fax [phone]  Which Pharmacy is filling the prescription: [facility] [address] [place] [address]  Pharmacy Notified: YES  Patient Notified: Instructed pharmacy to notify patient once order is ready.

## 2024-05-07 ENCOUNTER — MYC REFILL (OUTPATIENT)
Dept: FAMILY MEDICINE | Facility: CLINIC | Age: 49
End: 2024-05-07
Payer: COMMERCIAL

## 2024-05-07 DIAGNOSIS — F90.9 ATTENTION DEFICIT HYPERACTIVITY DISORDER (ADHD), UNSPECIFIED ADHD TYPE: ICD-10-CM

## 2024-05-07 RX ORDER — DEXTROAMPHETAMINE SACCHARATE, AMPHETAMINE ASPARTATE, DEXTROAMPHETAMINE SULFATE AND AMPHETAMINE SULFATE 5; 5; 5; 5 MG/1; MG/1; MG/1; MG/1
20 TABLET ORAL 2 TIMES DAILY
Qty: 60 TABLET | Refills: 0 | Status: SHIPPED | OUTPATIENT
Start: 2024-05-07 | End: 2024-06-02

## 2024-05-07 RX ORDER — DEXTROAMPHETAMINE SACCHARATE, AMPHETAMINE ASPARTATE MONOHYDRATE, DEXTROAMPHETAMINE SULFATE AND AMPHETAMINE SULFATE 5; 5; 5; 5 MG/1; MG/1; MG/1; MG/1
20 CAPSULE, EXTENDED RELEASE ORAL 2 TIMES DAILY
Qty: 60 CAPSULE | Refills: 0 | Status: SHIPPED | OUTPATIENT
Start: 2024-05-07 | End: 2024-06-02

## 2024-06-02 ENCOUNTER — MYC REFILL (OUTPATIENT)
Dept: FAMILY MEDICINE | Facility: CLINIC | Age: 49
End: 2024-06-02
Payer: COMMERCIAL

## 2024-06-02 DIAGNOSIS — F90.9 ATTENTION DEFICIT HYPERACTIVITY DISORDER (ADHD), UNSPECIFIED ADHD TYPE: ICD-10-CM

## 2024-06-03 RX ORDER — DEXTROAMPHETAMINE SACCHARATE, AMPHETAMINE ASPARTATE, DEXTROAMPHETAMINE SULFATE AND AMPHETAMINE SULFATE 5; 5; 5; 5 MG/1; MG/1; MG/1; MG/1
20 TABLET ORAL 2 TIMES DAILY
Qty: 60 TABLET | Refills: 0 | Status: SHIPPED | OUTPATIENT
Start: 2024-06-03 | End: 2024-07-03

## 2024-06-03 RX ORDER — DEXTROAMPHETAMINE SACCHARATE, AMPHETAMINE ASPARTATE MONOHYDRATE, DEXTROAMPHETAMINE SULFATE AND AMPHETAMINE SULFATE 5; 5; 5; 5 MG/1; MG/1; MG/1; MG/1
40 CAPSULE, EXTENDED RELEASE ORAL DAILY
Qty: 60 CAPSULE | Refills: 0 | Status: SHIPPED | OUTPATIENT
Start: 2024-06-03 | End: 2024-07-03

## 2024-07-01 DIAGNOSIS — N52.9 ERECTILE DYSFUNCTION, UNSPECIFIED ERECTILE DYSFUNCTION TYPE: ICD-10-CM

## 2024-07-02 RX ORDER — SILDENAFIL 50 MG/1
TABLET, FILM COATED ORAL
Qty: 180 TABLET | Refills: 0 | OUTPATIENT
Start: 2024-07-02

## 2024-07-03 ENCOUNTER — MYC REFILL (OUTPATIENT)
Dept: FAMILY MEDICINE | Facility: CLINIC | Age: 49
End: 2024-07-03
Payer: COMMERCIAL

## 2024-07-03 ENCOUNTER — MYC MEDICAL ADVICE (OUTPATIENT)
Dept: FAMILY MEDICINE | Facility: CLINIC | Age: 49
End: 2024-07-03
Payer: COMMERCIAL

## 2024-07-03 DIAGNOSIS — N52.9 ERECTILE DYSFUNCTION, UNSPECIFIED ERECTILE DYSFUNCTION TYPE: ICD-10-CM

## 2024-07-03 DIAGNOSIS — F90.9 ATTENTION DEFICIT HYPERACTIVITY DISORDER (ADHD), UNSPECIFIED ADHD TYPE: ICD-10-CM

## 2024-07-03 RX ORDER — SILDENAFIL 50 MG/1
50-100 TABLET, FILM COATED ORAL DAILY PRN
Qty: 180 TABLET | Refills: 1 | Status: SHIPPED | OUTPATIENT
Start: 2024-07-03 | End: 2024-07-05

## 2024-07-03 RX ORDER — DEXTROAMPHETAMINE SACCHARATE, AMPHETAMINE ASPARTATE MONOHYDRATE, DEXTROAMPHETAMINE SULFATE AND AMPHETAMINE SULFATE 5; 5; 5; 5 MG/1; MG/1; MG/1; MG/1
40 CAPSULE, EXTENDED RELEASE ORAL DAILY
Qty: 60 CAPSULE | Refills: 0 | Status: SHIPPED | OUTPATIENT
Start: 2024-07-03

## 2024-07-03 RX ORDER — DEXTROAMPHETAMINE SACCHARATE, AMPHETAMINE ASPARTATE, DEXTROAMPHETAMINE SULFATE AND AMPHETAMINE SULFATE 5; 5; 5; 5 MG/1; MG/1; MG/1; MG/1
20 TABLET ORAL 2 TIMES DAILY
Qty: 60 TABLET | Refills: 0 | Status: SHIPPED | OUTPATIENT
Start: 2024-07-03

## 2024-07-03 RX ORDER — SILDENAFIL 50 MG/1
50-100 TABLET, FILM COATED ORAL DAILY PRN
Qty: 180 TABLET | Refills: 3 | OUTPATIENT
Start: 2024-07-03

## 2024-07-05 RX ORDER — SILDENAFIL 50 MG/1
50-100 TABLET, FILM COATED ORAL DAILY PRN
Qty: 180 TABLET | Refills: 1 | Status: SHIPPED | OUTPATIENT
Start: 2024-07-05

## 2024-10-15 ENCOUNTER — MYC REFILL (OUTPATIENT)
Dept: FAMILY MEDICINE | Facility: CLINIC | Age: 49
End: 2024-10-15
Payer: COMMERCIAL

## 2024-10-15 DIAGNOSIS — F90.9 ATTENTION DEFICIT HYPERACTIVITY DISORDER (ADHD), UNSPECIFIED ADHD TYPE: ICD-10-CM

## 2024-10-15 RX ORDER — DEXTROAMPHETAMINE SACCHARATE, AMPHETAMINE ASPARTATE MONOHYDRATE, DEXTROAMPHETAMINE SULFATE AND AMPHETAMINE SULFATE 5; 5; 5; 5 MG/1; MG/1; MG/1; MG/1
40 CAPSULE, EXTENDED RELEASE ORAL DAILY
Qty: 60 CAPSULE | Refills: 0 | Status: SHIPPED | OUTPATIENT
Start: 2024-10-15

## 2024-10-15 RX ORDER — DEXTROAMPHETAMINE SACCHARATE, AMPHETAMINE ASPARTATE, DEXTROAMPHETAMINE SULFATE AND AMPHETAMINE SULFATE 5; 5; 5; 5 MG/1; MG/1; MG/1; MG/1
20 TABLET ORAL 2 TIMES DAILY
Qty: 60 TABLET | Refills: 0 | Status: SHIPPED | OUTPATIENT
Start: 2024-10-15

## 2024-10-15 NOTE — TELEPHONE ENCOUNTER
Refilled but patient needs a virtual follow up in the next month. Please call and help him schedule this    Jame Colunga MD

## 2025-01-28 ENCOUNTER — TELEPHONE (OUTPATIENT)
Dept: FAMILY MEDICINE | Facility: CLINIC | Age: 50
End: 2025-01-28
Payer: COMMERCIAL

## 2025-01-28 NOTE — TELEPHONE ENCOUNTER
Prior Authorization Retail Medication Request    Medication/Dose: Sildenafil Citrate 50mg Tablets  Diagnosis and ICD code (if different than what is on RX):    New/renewal/insurance change PA/secondary ins. PA:  Previously Tried and Failed:    Rationale:      Insurance   Primary: [employer]  Insurance ID:  [id number]    Secondary (if applicable):  Insurance ID:      Pharmacy Information (if different than what is on RX)  Name:    Phone:    Fax:    Clinic Information  Preferred routing pool for dept communication: [facility]

## 2025-01-30 NOTE — TELEPHONE ENCOUNTER
Retail Pharmacy Prior Authorization Team   Phone: [phone]    PA Initiation    Medication: SILDENAFIL CITRATE 50 MG PO TABS  Insurance Company: [employer] Phone [phone] Fax [phone]  Pharmacy Filling the Rx: [facility] [address] [place] [address]  Filling Pharmacy Phone: [phone]  Filling Pharmacy Fax:    Start Date: 1/30/2025    [id number]

## 2025-02-03 NOTE — TELEPHONE ENCOUNTER
SUMIT Dorsey called Cecil on 2/3 and left a message to return call to clinic.      TC if patient returns call please relay message from provider below.     Willian Mcgee RN  [facility]

## 2025-02-03 NOTE — TELEPHONE ENCOUNTER
PRIOR AUTHORIZATION DENIED    Medication: SILDENAFIL CITRATE 50 MG PO TABS  Insurance Company: [employer] - Phone [phone] Fax [phone]  Denial Date: 2/3/2025  Denial Reason(s):     Max quantity covered is 6 tablets per 30 days.           Appeal Information:         Patient Notified: No

## 2025-02-05 ENCOUNTER — MYC REFILL (OUTPATIENT)
Dept: FAMILY MEDICINE | Facility: CLINIC | Age: 50
End: 2025-02-05
Payer: COMMERCIAL

## 2025-02-05 DIAGNOSIS — F90.9 ATTENTION DEFICIT HYPERACTIVITY DISORDER (ADHD), UNSPECIFIED ADHD TYPE: ICD-10-CM

## 2025-02-05 NOTE — TELEPHONE ENCOUNTER
This medication was recently ordered on 1/27/25 to [facility], patient did not pick this up because it has a higher cost and is asking for this to be moved to another pharmacy.     Called to verify with pharmacy that they did not pick this up and he has not and they cancelled the order on their end.     Order is pended for provider review.     Willian Mcgee RN  [facility]

## 2025-02-05 NOTE — TELEPHONE ENCOUNTER
Called and relayed message from Dr. Villalobos, patient verbalized understanding and will just use [facility] and not run it through his insurance.     Willian Mcgee RN  [facility]

## 2025-02-10 DIAGNOSIS — R61 GENERALIZED HYPERHIDROSIS: ICD-10-CM

## 2025-02-10 RX ORDER — GLYCOPYRROLATE 1 MG/1
3 TABLET ORAL DAILY
Qty: 180 TABLET | Refills: 0 | Status: SHIPPED | OUTPATIENT
Start: 2025-02-10

## 2025-02-10 RX ORDER — DEXTROAMPHETAMINE SACCHARATE, AMPHETAMINE ASPARTATE MONOHYDRATE, DEXTROAMPHETAMINE SULFATE AND AMPHETAMINE SULFATE 5; 5; 5; 5 MG/1; MG/1; MG/1; MG/1
40 CAPSULE, EXTENDED RELEASE ORAL DAILY
Qty: 60 CAPSULE | Refills: 0 | Status: SHIPPED | OUTPATIENT
Start: 2025-02-10

## 2025-02-26 DIAGNOSIS — N52.9 ERECTILE DYSFUNCTION, UNSPECIFIED ERECTILE DYSFUNCTION TYPE: ICD-10-CM

## 2025-02-26 RX ORDER — SILDENAFIL 50 MG/1
TABLET, FILM COATED ORAL
Qty: 90 TABLET | Refills: 0 | Status: SHIPPED | OUTPATIENT
Start: 2025-02-26

## 2025-03-11 ENCOUNTER — PATIENT OUTREACH (OUTPATIENT)
Dept: CARE COORDINATION | Facility: CLINIC | Age: 50
End: 2025-03-11
Payer: COMMERCIAL

## 2025-03-27 ENCOUNTER — OFFICE VISIT (OUTPATIENT)
Dept: FAMILY MEDICINE | Facility: CLINIC | Age: 50
End: 2025-03-27
Payer: COMMERCIAL

## 2025-03-27 VITALS
RESPIRATION RATE: 18 BRPM | TEMPERATURE: 98.4 F | OXYGEN SATURATION: 97 % | HEART RATE: 110 BPM | WEIGHT: 290 LBS | SYSTOLIC BLOOD PRESSURE: 136 MMHG | DIASTOLIC BLOOD PRESSURE: 88 MMHG | BODY MASS INDEX: 35.31 KG/M2 | HEIGHT: 76 IN

## 2025-03-27 DIAGNOSIS — E66.09 CLASS 1 OBESITY DUE TO EXCESS CALORIES WITH SERIOUS COMORBIDITY AND BODY MASS INDEX (BMI) OF 34.0 TO 34.9 IN ADULT: ICD-10-CM

## 2025-03-27 DIAGNOSIS — Z11.59 NEED FOR HEPATITIS C SCREENING TEST: ICD-10-CM

## 2025-03-27 DIAGNOSIS — F90.9 ATTENTION DEFICIT HYPERACTIVITY DISORDER (ADHD), UNSPECIFIED ADHD TYPE: ICD-10-CM

## 2025-03-27 DIAGNOSIS — Z00.00 ROUTINE GENERAL MEDICAL EXAMINATION AT A HEALTH CARE FACILITY: Primary | ICD-10-CM

## 2025-03-27 DIAGNOSIS — E66.811 CLASS 1 OBESITY DUE TO EXCESS CALORIES WITH SERIOUS COMORBIDITY AND BODY MASS INDEX (BMI) OF 34.0 TO 34.9 IN ADULT: ICD-10-CM

## 2025-03-27 DIAGNOSIS — Z13.220 LIPID SCREENING: ICD-10-CM

## 2025-03-27 DIAGNOSIS — Z11.4 SCREENING FOR HIV (HUMAN IMMUNODEFICIENCY VIRUS): ICD-10-CM

## 2025-03-27 DIAGNOSIS — N52.9 ERECTILE DYSFUNCTION, UNSPECIFIED ERECTILE DYSFUNCTION TYPE: ICD-10-CM

## 2025-03-27 DIAGNOSIS — E03.9 ACQUIRED HYPOTHYROIDISM: ICD-10-CM

## 2025-03-27 DIAGNOSIS — E04.1 THYROID NODULE: ICD-10-CM

## 2025-03-27 LAB
ERYTHROCYTE [DISTWIDTH] IN BLOOD BY AUTOMATED COUNT: 12.8 % (ref 10–15)
EST. AVERAGE GLUCOSE BLD GHB EST-MCNC: 100 MG/DL
HBA1C MFR BLD: 5.1 % (ref 0–5.6)
HCT VFR BLD AUTO: 45.6 % (ref 40–53)
HGB BLD-MCNC: 16.2 G/DL (ref 13.3–17.7)
MCH RBC QN AUTO: 31.9 PG (ref 26.5–33)
MCHC RBC AUTO-ENTMCNC: 35.5 G/DL (ref 31.5–36.5)
MCV RBC AUTO: 90 FL (ref 78–100)
PLATELET # BLD AUTO: 246 10E3/UL (ref 150–450)
RBC # BLD AUTO: 5.08 10E6/UL (ref 4.4–5.9)
WBC # BLD AUTO: 9 10E3/UL (ref 4–11)

## 2025-03-27 ASSESSMENT — PAIN SCALES - GENERAL: PAINLEVEL_OUTOF10: NO PAIN (0)

## 2025-03-27 NOTE — PATIENT INSTRUCTIONS
Patient Education   Preventive Care Advice   This is general advice given by our system to help you stay healthy. However, your care team may have specific advice just for you. Please talk to your care team about your preventive care needs.  Nutrition  Eat 5 or more servings of fruits and vegetables each day.  Try wheat bread, brown rice and whole grain pasta (instead of white bread, rice, and pasta).  Get enough calcium and vitamin D. Check the label on foods and aim for 100% of the RDA (recommended daily allowance).  Lifestyle  Exercise at least 150 minutes each week  (30 minutes a day, 5 days a week).  Do muscle strengthening activities 2 days a week. These help control your weight and prevent disease.  No smoking.  Wear sunscreen to prevent skin cancer.  Have a dental exam and cleaning every 6 months.  Yearly exams  See your health care team every year to talk about:  Any changes in your health.  Any medicines your care team has prescribed.  Preventive care, family planning, and ways to prevent chronic diseases.  Shots (vaccines)   HPV shots (up to age 26), if you've never had them before.  Hepatitis B shots (up to age 59), if you've never had them before.  COVID-19 shot: Get this shot when it's due.  Flu shot: Get a flu shot every year.  Tetanus shot: Get a tetanus shot every 10 years.  Pneumococcal, hepatitis A, and RSV shots: Ask your care team if you need these based on your risk.  Shingles shot (for age 50 and up)  General health tests  Diabetes screening:  Starting at age 35, Get screened for diabetes at least every 3 years.  If you are younger than age 35, ask your care team if you should be screened for diabetes.  Cholesterol test: At age 39, start having a cholesterol test every 5 years, or more often if advised.  Bone density scan (DEXA): At age 50, ask your care team if you should have this scan for osteoporosis (brittle bones).  Hepatitis C: Get tested at least once in your life.  STIs (sexually  transmitted infections)  Before age 24: Ask your care team if you should be screened for STIs.  After age 24: Get screened for STIs if you're at risk. You are at risk for STIs (including HIV) if:  You are sexually active with more than one person.  You don't use condoms every time.  You or a partner was diagnosed with a sexually transmitted infection.  If you are at risk for HIV, ask about PrEP medicine to prevent HIV.  Get tested for HIV at least once in your life, whether you are at risk for HIV or not.  Cancer screening tests  Cervical cancer screening: If you have a cervix, begin getting regular cervical cancer screening tests starting at age 21.  Breast cancer scan (mammogram): If you've ever had breasts, begin having regular mammograms starting at age 40. This is a scan to check for breast cancer.  Colon cancer screening: It is important to start screening for colon cancer at age 45.  Have a colonoscopy test every 10 years (or more often if you're at risk) Or, ask your provider about stool tests like a FIT test every year or Cologuard test every 3 years.  To learn more about your testing options, visit:   .  For help making a decision, visit:   https://bit.ly/rv93499.  Prostate cancer screening test: If you have a prostate, ask your care team if a prostate cancer screening test (PSA) at age 55 is right for you.  Lung cancer screening: If you are a current or former smoker ages 50 to 80, ask your care team if ongoing lung cancer screenings are right for you.  For informational purposes only. Not to replace the advice of your health care provider. Copyright   2023 [employer]. All rights reserved. Clinically reviewed by the [facility] Transitions Program. [employer] 163928 - REV 01/24.

## 2025-03-27 NOTE — PROGRESS NOTES
Prior to immunization administration, verified patients identity using patient s name and date of birth. Please see Immunization Activity for additional information.     Screening Questionnaire for Adult Immunization    Are you sick today?   No   Do you have allergies to medications, food, a vaccine component or latex?   No   Have you ever had a serious reaction after receiving a vaccination?   No   Do you have a long-term health problem with heart, lung, kidney, or metabolic disease (e.g., diabetes), asthma, a blood disorder, no spleen, complement component deficiency, a cochlear implant, or a spinal fluid leak?  Are you on long-term aspirin therapy?   No   Do you have cancer, leukemia, HIV/AIDS, or any other immune system problem?   No   Do you have a parent, brother, or sister with an immune system problem?   No   In the past 3 months, have you taken medications that affect  your immune system, such as prednisone, other steroids, or anticancer drugs; drugs for the treatment of rheumatoid arthritis, Crohn s disease, or psoriasis; or have you had radiation treatments?   No   Have you had a seizure, or a brain or other nervous system problem?   No   During the past year, have you received a transfusion of blood or blood    products, or been given immune (gamma) globulin or antiviral drug?   No   For women: Are you pregnant or is there a chance you could become       pregnant during the next month?   No   Have you received any vaccinations in the past 4 weeks?   No     Immunization questionnaire answers were all negative.      Patient instructed to remain in clinic for 15 minutes afterwards, and to report any adverse reactions.     Screening performed by Soco Samuel CMA on 3/27/2025 at 1:17 PM.    Preventive Care Visit  [facility]  DAYSI Chiang CNP, Family Medicine  Mar 27, 2025      Assessment & Plan     Routine general medical examination at a health care facility  Physical  examination completed today.  Discussed routine healthcare maintenance including importance of routine cancer screenings, healthy diet and regular exercise.  Will obtain blood work as noted below and follow-up with results when available.  Patient return to clinic for annual exam in approximately 1 year or sooner with any new concerns.  - CBC with platelets  - CBC with platelets    Acquired hypothyroidism  Continue levothyroxine 50 mg MCG daily    Class 1 obesity due to excess calories with serious comorbidity and body mass index (BMI) of 34.0 to 34.9 in adult  Will check A1c to screen for diabetes and metabolic panel today.  Courage continued efforts at healthy diet and regular exercise.  - Comprehensive metabolic panel (BMP + Alb, Alk Phos, ALT, AST, Total. Bili, TP)  - Hemoglobin A1c  - Comprehensive metabolic panel (BMP + Alb, Alk Phos, ALT, AST, Total. Bili, TP)  - Hemoglobin A1c    Thyroid nodule  Patient was told of enlarged thyroid lymph nodes and/or thyroid nodule and was recommended follow-up on this.  Will order thyroid ultrasound.  - TSH WITH FREE T4 REFLEX  - US Thyroid  - TSH WITH FREE T4 REFLEX    Attention deficit hyperactivity disorder (ADHD), unspecified ADHD type  PDMP reviewed today.  He continues Adderall 40 mg XR daily and immediate release of Adderall 20 mg 2 times daily.    Erectile dysfunction, unspecified erectile dysfunction type  Continue use of Viagra and Cialis as needed.    Lipid screening  Will check lipids today.  - Lipid panel reflex to direct LDL Fasting  - Lipid panel reflex to direct LDL Fasting    Need for hepatitis C screening test  - Hepatitis C Screen Reflex to HCV RNA Quant and Genotype  - Hepatitis C Screen Reflex to HCV RNA Quant and Genotype    Screening for HIV (human immunodeficiency virus)  - HIV Antigen Antibody Combo  - HIV Antigen Antibody Combo            BMI  Estimated body mass index is 35.77 kg/m  as calculated from the following:    Height as of this encounter:  "1.918 m (6' 3.5\").    Weight as of this encounter: 131.5 kg (290 lb).   Weight management plan: Discussed healthy diet and exercise guidelines      Work on weight loss  Regular exercise    Concepción Jacob is a 49 year old, presenting for the following:  Physical (Not fasting)        4/10/2024     8:24 AM   Additional Questions   Roomed by Megha AMARAL    The patient presents today for his annual exam.  Medical history is significant for hypothyroidism, currently taking 150 mcg daily of levothyroxine and is tolerating this well.  He was informed after having workup for a pinched nerve on his neck that he had some enlarged lymph nodes or thyroid nodule and to follow-up on this.  He has ADHD and continues 40 mg daily of extended release Adderall with short acting 20 mg as needed twice a day.  He feels that this dose is adequate in managing symptoms of ADHD.  He denies any side effects such as difficulty sleeping, change in appetite, increased anxiety etc.  He continues use of gabapentin for pinched nerve/restless legs and feels that this is helping.  He also uses Celebrex as needed.  He has a prescription for Viagra and Cialis as needed for erectile dysfunction.    He is up-to-date on colonoscopy.  He would like routine lab work completed today.  He does not have any additional concerns at this time    Advance Care Planning  Patient does not have a Health Care Directive: Discussed advance care planning with patient; information given to patient to review.      3/27/2025   General Health   How would you rate your overall physical health? Good          No data to display                   No data to display                  12/18/2023   Social Factors   Worry food won't last until get money to buy more No   Food not last or not have enough money for food? No   Do you have housing? (Housing is defined as stable permanent housing and does not include staying ouside in a car, in a tent, in an abandoned " "building, in an overnight shelter, or couch-surfing.) Yes   Are you worried about losing your housing? No   Lack of transportation? No   Unable to get utilities (heat,electricity)? No          No data to display                    Today's PHQ-2 Score:       3/27/2025    12:46 PM   PHQ-2 ( 1999 [employer])   Q1: Little interest or pleasure in doing things 0   Q2: Feeling down, depressed or hopeless 0   PHQ-2 Score 0            No data to display              Social History     Tobacco Use    Smoking status: Never     Passive exposure: Never    Smokeless tobacco: Never   Vaping Use    Vaping status: Never Used   Substance Use Topics    Alcohol use: Yes     Alcohol/week: 2.0 standard drinks of alcohol    Drug use: Not Currently     Types: Marijuana       ASCVD Risk   The 10-year ASCVD risk score (Francis HUANG, et al., 2019) is: 3.3%    Values used to calculate the score:      Age: 49 years      Sex: Male      Is Non- : No      Diabetic: No      Tobacco smoker: No      Systolic Blood Pressure: 136 mmHg      Is BP treated: No      HDL Cholesterol: 45 mg/dL      Total Cholesterol: 178 mg/dL         No data to display                 Reviewed and updated as needed this visit by Provider                    History reviewed. No pertinent past medical history.  Past Surgical History:   Procedure Laterality Date    [facility] APPENDECTOMY      Description: Appendectomy;  Recorded: 09/09/2008;  Comments: 1990.         Review of Systems  Constitutional, HEENT, cardiovascular, pulmonary, gi and gu systems are negative, except as otherwise noted.     Objective    Exam  /88   Pulse 110   Temp 98.4  F (36.9  C) (Oral)   Resp 18   Ht 1.918 m (6' 3.5\")   Wt 131.5 kg (290 lb)   SpO2 97%   BMI 35.77 kg/m     Estimated body mass index is 35.77 kg/m  as calculated from the following:    Height as of this encounter: 1.918 m (6' 3.5\").    Weight as of this encounter: 131.5 kg (290 lb).    Physical " Exam  GENERAL: alert and no distress  EYES: Eyes grossly normal to inspection, PERRL and conjunctivae and sclerae normal  HENT: ear canals and TM's normal, nose and mouth without ulcers or lesions  NECK: no adenopathy, no asymmetry, masses, or scars  RESP: lungs clear to auscultation - no rales, rhonchi or wheezes  CV: regular rate and rhythm, normal S1 S2, no S3 or S4, no murmur, click or rub, no peripheral edema  ABDOMEN: soft, nontender, no hepatosplenomegaly, no masses and bowel sounds normal  MS: no gross musculoskeletal defects noted, no edema  SKIN: no suspicious lesions or rashes  NEURO: Normal strength and tone, mentation intact and speech normal  PSYCH: mentation appears normal, affect normal/bright        Signed Electronically by: DAYSI Chiang CNP

## 2025-04-22 DIAGNOSIS — R61 GENERALIZED HYPERHIDROSIS: ICD-10-CM

## 2025-04-22 RX ORDER — GLYCOPYRROLATE 1 MG/1
2 TABLET ORAL DAILY
Qty: 180 TABLET | Refills: 3 | Status: SHIPPED | OUTPATIENT
Start: 2025-04-22

## 2025-04-24 ENCOUNTER — MYC REFILL (OUTPATIENT)
Dept: FAMILY MEDICINE | Facility: CLINIC | Age: 50
End: 2025-04-24
Payer: COMMERCIAL

## 2025-04-24 DIAGNOSIS — N52.9 ERECTILE DYSFUNCTION, UNSPECIFIED ERECTILE DYSFUNCTION TYPE: ICD-10-CM

## 2025-04-24 DIAGNOSIS — F90.9 ATTENTION DEFICIT HYPERACTIVITY DISORDER (ADHD), UNSPECIFIED ADHD TYPE: ICD-10-CM

## 2025-04-25 ENCOUNTER — TELEPHONE (OUTPATIENT)
Dept: FAMILY MEDICINE | Facility: CLINIC | Age: 50
End: 2025-04-25
Payer: COMMERCIAL

## 2025-04-25 RX ORDER — SILDENAFIL 50 MG/1
TABLET, FILM COATED ORAL
Qty: 90 TABLET | Refills: 1 | Status: SHIPPED | OUTPATIENT
Start: 2025-04-25

## 2025-04-25 RX ORDER — DEXTROAMPHETAMINE SACCHARATE, AMPHETAMINE ASPARTATE MONOHYDRATE, DEXTROAMPHETAMINE SULFATE AND AMPHETAMINE SULFATE 5; 5; 5; 5 MG/1; MG/1; MG/1; MG/1
40 CAPSULE, EXTENDED RELEASE ORAL DAILY
Qty: 60 CAPSULE | Refills: 0 | Status: SHIPPED | OUTPATIENT
Start: 2025-04-25

## 2025-04-25 RX ORDER — DEXTROAMPHETAMINE SACCHARATE, AMPHETAMINE ASPARTATE, DEXTROAMPHETAMINE SULFATE AND AMPHETAMINE SULFATE 5; 5; 5; 5 MG/1; MG/1; MG/1; MG/1
20 TABLET ORAL 2 TIMES DAILY
Qty: 60 TABLET | Refills: 0 | Status: SHIPPED | OUTPATIENT
Start: 2025-04-25

## 2025-04-25 NOTE — TELEPHONE ENCOUNTER
"See message from the PCP, to the patient, on 4/25/25, regarding a refill of Adderall:    \"I refilled this medication for a month but patient needs to do a Urine drug screen and sing CSA. Call him and tell him to make a lab visit to do these. If he wants me to keep prescribing his stimulant he needs to see me. Cannot see other providers     Jame Colunga MD\"    Writer called the patient and left a message to return call.    When the patient calls back, please relay the above PCP's message to the patient and offer to assist the patient with scheduling a lab-only appointment within a month for the patient to complete a urine drug screen and a controlled substance agreement.    Please route to the RN queue for any questions or concerns.    Ninfa Mckeon RN, BSN  [facility]    "

## 2025-04-25 NOTE — TELEPHONE ENCOUNTER
Prior Authorization Retail Medication Request    Medication/Dose: Adderall XR 20mg  Diagnosis and ICD code (if different than what is on RX):    New/renewal/insurance change PA/secondary ins. PA:  Previously Tried and Failed:    Rationale:      Insurance   Primary: [employer]  Insurance ID:  [id number]    Secondary (if applicable):  Insurance ID:      Pharmacy Information (if different than what is on RX)  Name:  [facility] [address]   Phone:  see [facility]  Fax:    Clinic Information  Preferred routing pool for dept communication: [facility]

## 2025-04-25 NOTE — TELEPHONE ENCOUNTER
Prior Authorization Retail Medication Request    Medication/Dose: Adderall 20mg tablet  Diagnosis and ICD code (if different than what is on RX):    New/renewal/insurance change PA/secondary ins. PA:  Previously Tried and Failed:    Rationale:      Insurance   Primary: [employer]   Insurance ID:  [id number]    Secondary (if applicable):  Insurance ID:      Pharmacy Information (if different than what is on RX)  [facility]  Phone:  see chart  Fax:    Clinic Information  Preferred routing pool for dept communication: [place] [facility] primary care team

## 2025-04-28 NOTE — TELEPHONE ENCOUNTER
See message from the PCP, to the patient, on 4/25/25, regarding a refill of Adderall.    Writer called the patient and relayed the above refill recommendations to the patient, who verbalized understanding and agrees with the plan.    Writer assisted the patient with scheduling an in-clinic appointment, with the PCP, on Friday, 5/9/25, at 9:00 am, for a follow up appointment.    Denies other questions or concerns at this time.    Ninfa Mckeon RN, BSN  [facility]

## 2025-04-29 NOTE — TELEPHONE ENCOUNTER
Prior Authorization Approval    Medication: AMPHETAMINE-DEXTROAMPHETAMINE 20 MG PO TABS  Authorization Effective Date: 4/29/2025  Authorization Expiration Date: 4/28/2028  Insurance Company: [facility] - Phone [phone] Fax [phone]  Which Pharmacy is filling the prescription: [facility] [address] [place] [address]  Pharmacy Notified: YES  Patient Notified: YES (faxed approval letter to pharmacy and notified patient via [employer] message)

## 2025-04-29 NOTE — TELEPHONE ENCOUNTER
PA Initiation    Medication: AMPHETAMINE-DEXTROAMPHET ER 20 MG PO CP24  Insurance Company: [employer] - Phone [phone] Fax [phone]  Pharmacy Filling the Rx: [facility] [address] [place] [address]  Filling Pharmacy Phone:    Filling Pharmacy Fax: [phone]  Start Date: 4/29/2025

## 2025-04-29 NOTE — TELEPHONE ENCOUNTER
Prior Authorization Approval    Medication: AMPHETAMINE-DEXTROAMPHET ER 20 MG PO CP24  Authorization Effective Date: 4/29/2025  Authorization Expiration Date: 4/29/2026  Approved Dose/Quantity:   Reference #: [id number]   Insurance Company: [employer] [phone] Fax [phone]    Which Pharmacy is filling the prescription: [facility] [address] [place] [address]  Pharmacy Notified: YES  Patient Notified: Instructed pharmacy to notify patient when script is ready to pick-up/ship

## 2025-04-29 NOTE — TELEPHONE ENCOUNTER
Retail Pharmacy Prior Authorization Team   Phone: [phone]    PA Initiation    Medication: AMPHETAMINE-DEXTROAMPHETAMINE 20 MG PO TABS  Insurance Company: [employer] - Phone [phone] Fax [phone]  Pharmacy Filling the Rx: [facility] [address] [place] [address]  Filling Pharmacy Phone: [phone]  Filling Pharmacy Fax:    Start Date: 4/29/2025    RICH RODRIGUEZ (Key: [id number])

## 2025-05-09 PROBLEM — E66.812 CLASS 2 SEVERE OBESITY WITH BODY MASS INDEX (BMI) OF 35 TO 39.9 WITH SERIOUS COMORBIDITY (H): Status: ACTIVE | Noted: 2025-05-09

## 2025-05-09 PROBLEM — I15.8 OTHER SECONDARY HYPERTENSION: Status: ACTIVE | Noted: 2025-05-09

## 2025-05-09 PROBLEM — E66.01 CLASS 2 SEVERE OBESITY WITH BODY MASS INDEX (BMI) OF 35 TO 39.9 WITH SERIOUS COMORBIDITY (H): Status: ACTIVE | Noted: 2025-05-09

## 2025-05-12 ENCOUNTER — RESULTS FOLLOW-UP (OUTPATIENT)
Dept: FAMILY MEDICINE | Facility: CLINIC | Age: 50
End: 2025-05-12

## 2025-05-12 NOTE — RESULT ENCOUNTER NOTE
Cecil Moreira  Your results from your recent clinic visit show:  Your urine drug screen was as expected    If you have more questions please call the clinic at [phone] or send me a [employer] message    Dr. Jame Colunga

## 2025-05-19 NOTE — RESULT ENCOUNTER NOTE
Cecil Moreira  Your results from your recent clinic visit show:  Your urine drug screen was as expected    If you have more questions please call the clinic at [phone] or send me a [employer] message    Dr. Jame Colunga     12

## 2025-05-20 DIAGNOSIS — R61 GENERALIZED HYPERHIDROSIS: ICD-10-CM

## 2025-05-20 RX ORDER — GLYCOPYRROLATE 1 MG/1
3 TABLET ORAL DAILY
Qty: 270 TABLET | Refills: 3 | Status: SHIPPED | OUTPATIENT
Start: 2025-05-20

## 2025-06-01 DIAGNOSIS — E03.9 ACQUIRED HYPOTHYROIDISM: ICD-10-CM

## 2025-06-02 RX ORDER — LEVOTHYROXINE SODIUM 150 UG/1
150 TABLET ORAL DAILY
Qty: 90 TABLET | Refills: 3 | Status: SHIPPED | OUTPATIENT
Start: 2025-06-02

## 2025-06-13 ENCOUNTER — MYC REFILL (OUTPATIENT)
Dept: FAMILY MEDICINE | Facility: CLINIC | Age: 50
End: 2025-06-13
Payer: COMMERCIAL

## 2025-06-13 DIAGNOSIS — N52.9 ERECTILE DYSFUNCTION, UNSPECIFIED ERECTILE DYSFUNCTION TYPE: ICD-10-CM

## 2025-06-13 DIAGNOSIS — F90.9 ATTENTION DEFICIT HYPERACTIVITY DISORDER (ADHD), UNSPECIFIED ADHD TYPE: ICD-10-CM

## 2025-06-13 RX ORDER — DEXTROAMPHETAMINE SACCHARATE, AMPHETAMINE ASPARTATE, DEXTROAMPHETAMINE SULFATE AND AMPHETAMINE SULFATE 5; 5; 5; 5 MG/1; MG/1; MG/1; MG/1
20 TABLET ORAL 2 TIMES DAILY
Qty: 60 TABLET | Refills: 0 | Status: CANCELLED | OUTPATIENT
Start: 2025-06-13

## 2025-06-13 RX ORDER — DEXTROAMPHETAMINE SACCHARATE, AMPHETAMINE ASPARTATE MONOHYDRATE, DEXTROAMPHETAMINE SULFATE AND AMPHETAMINE SULFATE 5; 5; 5; 5 MG/1; MG/1; MG/1; MG/1
40 CAPSULE, EXTENDED RELEASE ORAL DAILY
Qty: 60 CAPSULE | Refills: 0 | Status: CANCELLED | OUTPATIENT
Start: 2025-06-13

## 2025-06-16 NOTE — TELEPHONE ENCOUNTER
Per Dr. Villalobos:  Given 3 months on last visit. Why is he asking for a refill of adderral?     Writer called patient and left message to return call to clinic.     If patient returns call please route to nurse queue to discuss medication refill request.    Sugey Murdock RN  [facility]

## 2025-06-16 NOTE — TELEPHONE ENCOUNTER
Patient returning call, reports he thought the prescription for his adderall was sent to [facility] and noted previously is is more expensive at [facility], so he did not pick this up. I confirmed with the patient the prescription was sent to the [facility] in [employer], he will follow up on this.    Patient additionally asking if Tadalafil prescription can be sent to the [facility] pharmacy. Order pended for review and approval if appropriate. Routing to PCP to review

## 2025-06-17 DIAGNOSIS — N52.9 ERECTILE DYSFUNCTION, UNSPECIFIED ERECTILE DYSFUNCTION TYPE: ICD-10-CM

## 2025-06-17 RX ORDER — TADALAFIL 20 MG/1
TABLET ORAL
Qty: 30 TABLET | Refills: 3 | Status: SHIPPED | OUTPATIENT
Start: 2025-06-17

## 2025-06-17 RX ORDER — TADALAFIL 20 MG/1
20 TABLET ORAL DAILY PRN
Qty: 30 TABLET | Refills: 3 | Status: SHIPPED | OUTPATIENT
Start: 2025-06-17 | End: 2025-06-17

## 2025-08-11 DIAGNOSIS — N52.9 ERECTILE DYSFUNCTION, UNSPECIFIED ERECTILE DYSFUNCTION TYPE: ICD-10-CM

## 2025-08-11 RX ORDER — SILDENAFIL 50 MG/1
TABLET, FILM COATED ORAL
Qty: 90 TABLET | Refills: 1 | OUTPATIENT
Start: 2025-08-11

## 2025-08-13 ENCOUNTER — MYC REFILL (OUTPATIENT)
Dept: FAMILY MEDICINE | Facility: CLINIC | Age: 50
End: 2025-08-13
Payer: COMMERCIAL

## 2025-08-13 DIAGNOSIS — N52.9 ERECTILE DYSFUNCTION, UNSPECIFIED ERECTILE DYSFUNCTION TYPE: ICD-10-CM

## 2025-08-13 RX ORDER — SILDENAFIL 50 MG/1
TABLET, FILM COATED ORAL
Qty: 45 TABLET | Refills: 1 | Status: SHIPPED | OUTPATIENT
Start: 2025-08-13

## 2025-08-25 ENCOUNTER — E-VISIT (OUTPATIENT)
Dept: FAMILY MEDICINE | Facility: CLINIC | Age: 50
End: 2025-08-25
Payer: COMMERCIAL

## 2025-08-25 DIAGNOSIS — I15.8 OTHER SECONDARY HYPERTENSION: Primary | ICD-10-CM

## 2025-08-26 DIAGNOSIS — I15.8 OTHER SECONDARY HYPERTENSION: ICD-10-CM

## 2025-08-26 RX ORDER — LISINOPRIL 20 MG/1
20 TABLET ORAL DAILY
Qty: 90 TABLET | Refills: 0 | Status: SHIPPED | OUTPATIENT
Start: 2025-08-26